# Patient Record
Sex: MALE | Race: WHITE | NOT HISPANIC OR LATINO
[De-identification: names, ages, dates, MRNs, and addresses within clinical notes are randomized per-mention and may not be internally consistent; named-entity substitution may affect disease eponyms.]

---

## 2017-01-03 ENCOUNTER — APPOINTMENT (OUTPATIENT)
Dept: PULMONOLOGY | Facility: CLINIC | Age: 72
End: 2017-01-03

## 2017-01-03 VITALS — SYSTOLIC BLOOD PRESSURE: 120 MMHG | OXYGEN SATURATION: 98 % | HEART RATE: 70 BPM | DIASTOLIC BLOOD PRESSURE: 70 MMHG

## 2017-01-10 ENCOUNTER — APPOINTMENT (OUTPATIENT)
Dept: INFECTIOUS DISEASE | Facility: CLINIC | Age: 72
End: 2017-01-10

## 2017-01-10 VITALS
BODY MASS INDEX: 23.49 KG/M2 | TEMPERATURE: 97.4 F | OXYGEN SATURATION: 98 % | HEIGHT: 68 IN | DIASTOLIC BLOOD PRESSURE: 75 MMHG | SYSTOLIC BLOOD PRESSURE: 126 MMHG | WEIGHT: 155 LBS | HEART RATE: 61 BPM

## 2017-01-10 DIAGNOSIS — J18.9 PNEUMONIA, UNSPECIFIED ORGANISM: ICD-10-CM

## 2017-01-10 DIAGNOSIS — R05 COUGH: ICD-10-CM

## 2017-01-10 DIAGNOSIS — Z87.898 PERSONAL HISTORY OF OTHER SPECIFIED CONDITIONS: ICD-10-CM

## 2017-01-10 DIAGNOSIS — E11.9 TYPE 2 DIABETES MELLITUS W/OUT COMPLICATIONS: ICD-10-CM

## 2017-01-13 PROBLEM — K64.8 INTERNAL HEMORRHOIDS: Status: RESOLVED | Noted: 2017-01-13 | Resolved: 2017-01-13

## 2017-01-13 PROBLEM — K64.5 THROMBOSED HEMORRHOIDS: Status: RESOLVED | Noted: 2017-01-13 | Resolved: 2017-01-13

## 2017-01-17 LAB
25(OH)D3 SERPL-MCNC: 34.9 NG/ML
ADJUSTED MITOGEN: >10 IU/ML
ADJUSTED TB AG: 0.05 IU/ML
ALBUMIN SERPL ELPH-MCNC: 4.6 G/DL
ALP BLD-CCNC: 99 U/L
ALT SERPL-CCNC: 20 U/L
ANION GAP SERPL CALC-SCNC: 12 MMOL/L
APPEARANCE: CLEAR
AST SERPL-CCNC: 23 U/L
BACTERIA: NEGATIVE
BASOPHILS # BLD AUTO: 0.02 K/UL
BASOPHILS NFR BLD AUTO: 0.4 %
BILIRUB SERPL-MCNC: 0.5 MG/DL
BILIRUBIN URINE: NEGATIVE
BLOOD URINE: NEGATIVE
BUN SERPL-MCNC: 16 MG/DL
C TRACH DNA SPEC QL NAA+PROBE: NORMAL
C TRACH RRNA SPEC QL NAA+PROBE: NORMAL
CALCIUM SERPL-MCNC: 9.9 MG/DL
CD3 CELLS # BLD: 1666 /UL
CD3 CELLS NFR BLD: 79 %
CD3+CD4+ CELLS # BLD: 457 /UL
CD3+CD4+ CELLS NFR BLD: 22 %
CD3+CD4+ CELLS/CD3+CD8+ CLL SPEC: 0.39 RATIO
CD3+CD8+ CELLS # SPEC: 1168 /UL
CD3+CD8+ CELLS NFR BLD: 56 %
CHLORIDE SERPL-SCNC: 100 MMOL/L
CHOLEST SERPL-MCNC: 123 MG/DL
CHOLEST/HDLC SERPL: 2.7 RATIO
CO2 SERPL-SCNC: 28 MMOL/L
COLOR: YELLOW
CREAT SERPL-MCNC: 1.07 MG/DL
CREAT SPEC-SCNC: 94 MG/DL
CREAT/PROT UR: 0.3 RATIO
EOSINOPHIL # BLD AUTO: 0.08 K/UL
EOSINOPHIL NFR BLD AUTO: 1.5 %
GLUCOSE QUALITATIVE U: NORMAL MG/DL
GLUCOSE SERPL-MCNC: 115 MG/DL
HBA1C MFR BLD HPLC: 6.4 %
HCT VFR BLD CALC: 51.7 %
HCV AB SER QL: NONREACTIVE
HCV S/CO RATIO: 0.13 S/CO
HDLC SERPL-MCNC: 46 MG/DL
HGB BLD-MCNC: 16 G/DL
HIV1 RNA # SERPL NAA+PROBE: NORMAL COPIES/ML
IMM GRANULOCYTES NFR BLD AUTO: 0 %
KETONES URINE: NEGATIVE
LDLC SERPL CALC-MCNC: 57 MG/DL
LEUKOCYTE ESTERASE URINE: NEGATIVE
LYMPHOCYTES # BLD AUTO: 2.35 K/UL
LYMPHOCYTES NFR BLD AUTO: 45 %
M TB IFN-G BLD-IMP: NEGATIVE
MAN DIFF?: NORMAL
MCHC RBC-ENTMCNC: 27.7 PG
MCHC RBC-ENTMCNC: 30.9 GM/DL
MCV RBC AUTO: 89.4 FL
MICROSCOPIC-UA: NORMAL
MONOCYTES # BLD AUTO: 0.28 K/UL
MONOCYTES NFR BLD AUTO: 5.4 %
N GONORRHOEA DNA SPEC QL NAA+PROBE: NORMAL
N GONORRHOEA RRNA SPEC QL NAA+PROBE: NORMAL
NEUTROPHILS # BLD AUTO: 2.49 K/UL
NEUTROPHILS NFR BLD AUTO: 47.7 %
NITRITE URINE: NEGATIVE
PH URINE: 5.5
PHOSPHATE SERPL-MCNC: 3.2 MG/DL
PLATELET # BLD AUTO: 181 K/UL
POTASSIUM SERPL-SCNC: 5 MMOL/L
PROT SERPL-MCNC: 8.9 G/DL
PROT UR-MCNC: 26 MG/DL
PROTEIN URINE: NEGATIVE MG/DL
PSA SERPL-MCNC: 1.54 NG/ML
QUANTIFERON GOLD NIL: 0.26 IU/ML
RBC # BLD: 5.78 M/UL
RBC # FLD: 15.3 %
RED BLOOD CELLS URINE: 1 /HPF
SODIUM SERPL-SCNC: 140 MMOL/L
SOURCE AMPLIFICATION: NORMAL
SPECIFIC GRAVITY URINE: 1.02
SQUAMOUS EPITHELIAL CELLS: 0 /HPF
T PALLIDUM AB SER QL IA: NEGATIVE
TRIGL SERPL-MCNC: 101 MG/DL
UROBILINOGEN URINE: NORMAL MG/DL
VIRAL LOAD LOG: <1.6 LG10COP/ML
WBC # FLD AUTO: 5.22 K/UL
WHITE BLOOD CELLS URINE: 1 /HPF

## 2017-01-18 ENCOUNTER — APPOINTMENT (OUTPATIENT)
Dept: SURGERY | Facility: CLINIC | Age: 72
End: 2017-01-18

## 2017-01-18 DIAGNOSIS — K64.9 UNSPECIFIED HEMORRHOIDS: ICD-10-CM

## 2017-01-18 DIAGNOSIS — K64.8 OTHER HEMORRHOIDS: ICD-10-CM

## 2017-01-18 DIAGNOSIS — K64.5 PERIANAL VENOUS THROMBOSIS: ICD-10-CM

## 2017-03-13 ENCOUNTER — RX RENEWAL (OUTPATIENT)
Age: 72
End: 2017-03-13

## 2017-03-15 RX ORDER — LEVOFLOXACIN 750 MG/1
750 TABLET, FILM COATED ORAL
Qty: 7 | Refills: 0 | Status: DISCONTINUED | COMMUNITY
Start: 2016-10-21

## 2017-03-22 ENCOUNTER — APPOINTMENT (OUTPATIENT)
Dept: SURGERY | Facility: CLINIC | Age: 72
End: 2017-03-22

## 2017-03-22 VITALS
OXYGEN SATURATION: 98 % | HEART RATE: 66 BPM | DIASTOLIC BLOOD PRESSURE: 77 MMHG | TEMPERATURE: 98.3 F | RESPIRATION RATE: 16 BRPM | SYSTOLIC BLOOD PRESSURE: 104 MMHG

## 2017-03-22 DIAGNOSIS — K64.4 RESIDUAL HEMORRHOIDAL SKIN TAGS: ICD-10-CM

## 2017-03-22 DIAGNOSIS — K57.32 DIVERTICULITIS OF LARGE INTESTINE W/OUT PERFORATION OR ABSCESS W/OUT BLEEDING: ICD-10-CM

## 2017-03-23 PROBLEM — K57.32 DIVERTICULITIS OF COLON: Status: ACTIVE | Noted: 2017-03-23

## 2017-04-05 ENCOUNTER — MED ADMIN CHARGE (OUTPATIENT)
Age: 72
End: 2017-04-05

## 2017-04-05 ENCOUNTER — APPOINTMENT (OUTPATIENT)
Dept: INFECTIOUS DISEASE | Facility: CLINIC | Age: 72
End: 2017-04-05

## 2017-04-06 ENCOUNTER — OTHER (OUTPATIENT)
Age: 72
End: 2017-04-06

## 2017-04-25 ENCOUNTER — RESULT REVIEW (OUTPATIENT)
Age: 72
End: 2017-04-25

## 2017-04-26 ENCOUNTER — FORM ENCOUNTER (OUTPATIENT)
Age: 72
End: 2017-04-26

## 2017-04-27 ENCOUNTER — APPOINTMENT (OUTPATIENT)
Dept: CT IMAGING | Facility: IMAGING CENTER | Age: 72
End: 2017-04-27

## 2017-04-27 ENCOUNTER — OUTPATIENT (OUTPATIENT)
Dept: OUTPATIENT SERVICES | Facility: HOSPITAL | Age: 72
LOS: 1 days | End: 2017-04-27
Payer: COMMERCIAL

## 2017-04-27 DIAGNOSIS — R93.8 ABNORMAL FINDINGS ON DIAGNOSTIC IMAGING OF OTHER SPECIFIED BODY STRUCTURES: ICD-10-CM

## 2017-04-27 PROCEDURE — 71260 CT THORAX DX C+: CPT

## 2017-04-27 PROCEDURE — 82565 ASSAY OF CREATININE: CPT

## 2017-05-02 ENCOUNTER — OTHER (OUTPATIENT)
Age: 72
End: 2017-05-02

## 2017-05-08 ENCOUNTER — OUTPATIENT (OUTPATIENT)
Dept: OUTPATIENT SERVICES | Facility: HOSPITAL | Age: 72
LOS: 1 days | End: 2017-05-08
Payer: COMMERCIAL

## 2017-05-08 VITALS
WEIGHT: 149.91 LBS | HEIGHT: 67 IN | RESPIRATION RATE: 17 BRPM | TEMPERATURE: 98 F | HEART RATE: 60 BPM | DIASTOLIC BLOOD PRESSURE: 75 MMHG | OXYGEN SATURATION: 99 % | SYSTOLIC BLOOD PRESSURE: 122 MMHG

## 2017-05-08 DIAGNOSIS — Z21 ASYMPTOMATIC HUMAN IMMUNODEFICIENCY VIRUS [HIV] INFECTION STATUS: ICD-10-CM

## 2017-05-08 DIAGNOSIS — J18.1 LOBAR PNEUMONIA, UNSPECIFIED ORGANISM: ICD-10-CM

## 2017-05-08 DIAGNOSIS — K60.3 ANAL FISTULA: ICD-10-CM

## 2017-05-08 DIAGNOSIS — I10 ESSENTIAL (PRIMARY) HYPERTENSION: ICD-10-CM

## 2017-05-08 DIAGNOSIS — Z01.818 ENCOUNTER FOR OTHER PREPROCEDURAL EXAMINATION: ICD-10-CM

## 2017-05-08 DIAGNOSIS — E78.5 HYPERLIPIDEMIA, UNSPECIFIED: ICD-10-CM

## 2017-05-08 DIAGNOSIS — Z90.49 ACQUIRED ABSENCE OF OTHER SPECIFIED PARTS OF DIGESTIVE TRACT: Chronic | ICD-10-CM

## 2017-05-08 LAB
ANION GAP SERPL CALC-SCNC: 15 MMOL/L — SIGNIFICANT CHANGE UP (ref 5–17)
BUN SERPL-MCNC: 21 MG/DL — SIGNIFICANT CHANGE UP (ref 7–23)
CALCIUM SERPL-MCNC: 9.6 MG/DL — SIGNIFICANT CHANGE UP (ref 8.4–10.5)
CHLORIDE SERPL-SCNC: 100 MMOL/L — SIGNIFICANT CHANGE UP (ref 96–108)
CO2 SERPL-SCNC: 25 MMOL/L — SIGNIFICANT CHANGE UP (ref 22–31)
CREAT SERPL-MCNC: 1.4 MG/DL — HIGH (ref 0.5–1.3)
GLUCOSE SERPL-MCNC: 105 MG/DL — HIGH (ref 70–99)
HCT VFR BLD CALC: 48.4 % — SIGNIFICANT CHANGE UP (ref 39–50)
HGB BLD-MCNC: 15.5 G/DL — SIGNIFICANT CHANGE UP (ref 13–17)
MCHC RBC-ENTMCNC: 27.5 PG — SIGNIFICANT CHANGE UP (ref 27–34)
MCHC RBC-ENTMCNC: 32 GM/DL — SIGNIFICANT CHANGE UP (ref 32–36)
MCV RBC AUTO: 85.8 FL — SIGNIFICANT CHANGE UP (ref 80–100)
PLATELET # BLD AUTO: 245 K/UL — SIGNIFICANT CHANGE UP (ref 150–400)
POTASSIUM SERPL-MCNC: 5 MMOL/L — SIGNIFICANT CHANGE UP (ref 3.5–5.3)
POTASSIUM SERPL-SCNC: 5 MMOL/L — SIGNIFICANT CHANGE UP (ref 3.5–5.3)
RBC # BLD: 5.64 M/UL — SIGNIFICANT CHANGE UP (ref 4.2–5.8)
RBC # FLD: 14.6 % — HIGH (ref 10.3–14.5)
SODIUM SERPL-SCNC: 140 MMOL/L — SIGNIFICANT CHANGE UP (ref 135–145)
WBC # BLD: 4.4 K/UL — SIGNIFICANT CHANGE UP (ref 3.8–10.5)
WBC # FLD AUTO: 4.4 K/UL — SIGNIFICANT CHANGE UP (ref 3.8–10.5)

## 2017-05-08 PROCEDURE — 80048 BASIC METABOLIC PNL TOTAL CA: CPT

## 2017-05-08 PROCEDURE — G0463: CPT

## 2017-05-08 PROCEDURE — 85027 COMPLETE CBC AUTOMATED: CPT

## 2017-05-08 RX ORDER — LIDOCAINE HCL 20 MG/ML
0.2 VIAL (ML) INJECTION ONCE
Qty: 0 | Refills: 0 | Status: DISCONTINUED | OUTPATIENT
Start: 2017-05-09 | End: 2017-05-24

## 2017-05-08 RX ORDER — SODIUM CHLORIDE 9 MG/ML
3 INJECTION INTRAMUSCULAR; INTRAVENOUS; SUBCUTANEOUS EVERY 8 HOURS
Qty: 0 | Refills: 0 | Status: DISCONTINUED | OUTPATIENT
Start: 2017-05-09 | End: 2017-05-24

## 2017-05-08 RX ORDER — LIDOCAINE HCL 20 MG/ML
0.3 VIAL (ML) INJECTION ONCE
Qty: 0 | Refills: 0 | Status: DISCONTINUED | OUTPATIENT
Start: 2017-05-09 | End: 2017-05-24

## 2017-05-08 NOTE — H&P PST ADULT - PMH
HIV (human immunodeficiency virus infection)    Hyperlipidemia    Hypertension HIV (human immunodeficiency virus infection)    Hyperlipidemia    Hypertension    Pneumonia of right lower lobe due to infectious organism Anal fistula    Hemorrhoids, internal    HIV (human immunodeficiency virus infection)    Hyperlipidemia    Hypertension    Marijuana smoker    Pneumonia of right lower lobe due to infectious organism

## 2017-05-08 NOTE — H&P PST ADULT - HISTORY OF PRESENT ILLNESS
70 y/o Male H/O HIV, HLD, Hemorrhoids. C/O Intermittent Diarrhea. S/P Colonoscopy. Presents for Examination under anesthesia, possible fistulotomy, possible seton placement. 70 y/o Male H/O HIV, HLD, Pneumonia 10/2016, Hemorrhoids, Anal Fistula .  C/O Intermittent Diarrhea. S/P Colonoscopy. Presents for Examination under anesthesia, possible fistulotomy, possible seton placement.

## 2017-05-08 NOTE — H&P PST ADULT - NSANTHOSAYNRD_GEN_A_CORE
No. ENEDINA screening performed.  STOP BANG Legend: 0-2 = LOW Risk; 3-4 = INTERMEDIATE Risk; 5-8 = HIGH Risk

## 2017-05-08 NOTE — H&P PST ADULT - PROBLEM SELECTOR PLAN 5
Pt had CT lungs 4/27/17 and CT continues to show consolidation right lower lobe. Left message for Dr Mack to fax note to [facility] pt is OK to have surgery.  (Pneumonia 10/2016)

## 2017-05-09 ENCOUNTER — OUTPATIENT (OUTPATIENT)
Dept: OUTPATIENT SERVICES | Facility: HOSPITAL | Age: 72
LOS: 1 days | End: 2017-05-09
Payer: COMMERCIAL

## 2017-05-09 ENCOUNTER — RESULT REVIEW (OUTPATIENT)
Age: 72
End: 2017-05-09

## 2017-05-09 ENCOUNTER — TRANSCRIPTION ENCOUNTER (OUTPATIENT)
Age: 72
End: 2017-05-09

## 2017-05-09 ENCOUNTER — APPOINTMENT (OUTPATIENT)
Dept: SURGERY | Facility: HOSPITAL | Age: 72
End: 2017-05-09

## 2017-05-09 VITALS
OXYGEN SATURATION: 98 % | RESPIRATION RATE: 16 BRPM | HEART RATE: 62 BPM | DIASTOLIC BLOOD PRESSURE: 66 MMHG | TEMPERATURE: 98 F | HEIGHT: 67 IN | WEIGHT: 149.91 LBS | SYSTOLIC BLOOD PRESSURE: 111 MMHG

## 2017-05-09 VITALS
DIASTOLIC BLOOD PRESSURE: 65 MMHG | OXYGEN SATURATION: 98 % | RESPIRATION RATE: 16 BRPM | TEMPERATURE: 98 F | HEART RATE: 67 BPM | SYSTOLIC BLOOD PRESSURE: 109 MMHG

## 2017-05-09 DIAGNOSIS — K60.3 ANAL FISTULA: ICD-10-CM

## 2017-05-09 DIAGNOSIS — Z90.49 ACQUIRED ABSENCE OF OTHER SPECIFIED PARTS OF DIGESTIVE TRACT: Chronic | ICD-10-CM

## 2017-05-09 PROCEDURE — 88304 TISSUE EXAM BY PATHOLOGIST: CPT | Mod: 26

## 2017-05-09 PROCEDURE — 46258 REMOVE IN/EX HEM GRP W/FISTU: CPT

## 2017-05-09 PROCEDURE — C1889: CPT

## 2017-05-09 PROCEDURE — 88304 TISSUE EXAM BY PATHOLOGIST: CPT

## 2017-05-09 RX ORDER — OXYCODONE HYDROCHLORIDE 5 MG/1
1 TABLET ORAL
Qty: 25 | Refills: 0
Start: 2017-05-09

## 2017-05-09 RX ORDER — ONDANSETRON 8 MG/1
4 TABLET, FILM COATED ORAL ONCE
Qty: 0 | Refills: 0 | Status: DISCONTINUED | OUTPATIENT
Start: 2017-05-09 | End: 2017-05-24

## 2017-05-09 RX ORDER — CELECOXIB 200 MG/1
200 CAPSULE ORAL ONCE
Qty: 0 | Refills: 0 | Status: DISCONTINUED | OUTPATIENT
Start: 2017-05-09 | End: 2017-05-24

## 2017-05-09 RX ORDER — OXYCODONE HYDROCHLORIDE 5 MG/1
5 TABLET ORAL ONCE
Qty: 0 | Refills: 0 | Status: DISCONTINUED | OUTPATIENT
Start: 2017-05-09 | End: 2017-05-09

## 2017-05-09 RX ORDER — SODIUM CHLORIDE 9 MG/ML
1000 INJECTION, SOLUTION INTRAVENOUS
Qty: 0 | Refills: 0 | Status: DISCONTINUED | OUTPATIENT
Start: 2017-05-09 | End: 2017-05-24

## 2017-05-09 NOTE — ASU PATIENT PROFILE, ADULT - NSSUBSTANCEUSE_GEN_ALL_CORE_SD
caffeine/street drug/inhalant/medication abuse street drug/inhalant/medication abuse/Current Marijuana smoker/caffeine

## 2017-05-09 NOTE — ASU PATIENT PROFILE, ADULT - PMH
Anal fistula    Hemorrhoids, internal    HIV (human immunodeficiency virus infection)    Hyperlipidemia    Hypertension    Marijuana smoker    Pneumonia of right lower lobe due to infectious organism

## 2017-05-09 NOTE — BRIEF OPERATIVE NOTE - PROCEDURE
Hemorrhoid excisions  05/09/2017    Active  AVALENTIN2  Fistulotomy, anal  05/09/2017    Active  AVALENTIN2

## 2017-05-09 NOTE — ASU DISCHARGE PLAN (ADULT/PEDIATRIC). - INSTRUCTIONS
You may resume regular diet when you get home, depending upon how you feel. Peanuts, popcorn, and other "scratchy" food should be avoided

## 2017-05-09 NOTE — ASU PATIENT PROFILE, ADULT - NS TRANSFER PATIENT BELONGINGS
Jewelry/Money (specify)/bracelet/necklace/Wrist Watch Wrist Watch/bracelet/necklace/None/Jewelry/Money (specify)

## 2017-05-09 NOTE — ASU DISCHARGE PLAN (ADULT/PEDIATRIC). - FOLLOWUP APPOINTMENT CLINIC/PHYSICIAN
Please follow up with Dr. Barger 2 weeks after your operation. You may call [phone] to schedule an appointment.

## 2017-05-09 NOTE — ASU DISCHARGE PLAN (ADULT/PEDIATRIC). - MEDICATION SUMMARY - MEDICATIONS TO TAKE
I will START or STAY ON the medications listed below when I get home from the hospital:    Viagra  --  by mouth   -- Indication: For home med    oxyCODONE 5 mg oral tablet  -- 1 tab(s) by mouth every 4 hours, As Needed to 6 hours -for moderate pain -for severe pain MDD:6  -- Caution federal law prohibits the transfer of this drug to any person other  than the person for whom it was prescribed.  It is very important that you take or use this exactly as directed.  Do not skip doses or discontinue unless directed by your doctor.  May cause drowsiness.  Alcohol may intensify this effect.  Use care when operating dangerous machinery.  This prescription cannot be refilled.  Using more of this medication than prescribed may cause serious breathing problems.    -- Indication: For moderate to severe pain    aspirin 81 mg oral tablet  -- 1 tab(s) by mouth once a day  -- Indication: For home med    sertraline 50 mg oral tablet  -- 0.5 tab(s) by mouth once a day  -- Indication: For home med    Lipitor 20 mg oral tablet  -- 1 tab(s) by mouth once a day  -- Indication: For home med    Zetia 10 mg oral tablet  -- 1 tab(s) by mouth once a day  -- Indication: For home med    Tivicay 50 mg oral tablet  -- 1 tab(s) by mouth once a day  -- Indication: For home med    Truvada  --  by mouth   -- Indication: For home med    hydroCHLOROthiazide 12.5 mg oral tablet  -- 1 tab(s) by mouth once a day  -- Indication: For home med    multivitamin  -- 1 tab(s) by mouth once a day  -- Indication: For home med    Vitamin D3 1000 intl units oral capsule  -- 1 cap(s) by mouth once a day  -- Indication: For home med

## 2017-05-10 ENCOUNTER — APPOINTMENT (OUTPATIENT)
Dept: INFECTIOUS DISEASE | Facility: CLINIC | Age: 72
End: 2017-05-10

## 2017-05-10 VITALS
SYSTOLIC BLOOD PRESSURE: 117 MMHG | OXYGEN SATURATION: 94 % | HEART RATE: 86 BPM | WEIGHT: 153 LBS | DIASTOLIC BLOOD PRESSURE: 68 MMHG | HEIGHT: 68 IN | TEMPERATURE: 98.2 F | BODY MASS INDEX: 23.19 KG/M2

## 2017-05-16 ENCOUNTER — MEDICATION RENEWAL (OUTPATIENT)
Age: 72
End: 2017-05-16

## 2017-05-16 LAB — SURGICAL PATHOLOGY STUDY: SIGNIFICANT CHANGE UP

## 2017-05-18 ENCOUNTER — APPOINTMENT (OUTPATIENT)
Dept: PULMONOLOGY | Facility: CLINIC | Age: 72
End: 2017-05-18

## 2017-05-18 VITALS
WEIGHT: 153 LBS | HEIGHT: 68 IN | SYSTOLIC BLOOD PRESSURE: 112 MMHG | HEART RATE: 61 BPM | BODY MASS INDEX: 23.19 KG/M2 | RESPIRATION RATE: 17 BRPM | OXYGEN SATURATION: 99 % | DIASTOLIC BLOOD PRESSURE: 70 MMHG

## 2017-05-23 LAB
ALBUMIN SERPL ELPH-MCNC: 4.3 G/DL
ALP BLD-CCNC: 95 U/L
ALT SERPL-CCNC: 13 U/L
ANION GAP SERPL CALC-SCNC: 16 MMOL/L
AST SERPL-CCNC: 20 U/L
BASOPHILS # BLD AUTO: 0.03 K/UL
BASOPHILS NFR BLD AUTO: 0.4 %
BILIRUB SERPL-MCNC: 0.3 MG/DL
BUN SERPL-MCNC: 21 MG/DL
CALCIUM SERPL-MCNC: 9.6 MG/DL
CD3 CELLS # BLD: 2137 /UL
CD3 CELLS NFR BLD: 80 %
CD3+CD4+ CELLS # BLD: 746 /UL
CD3+CD4+ CELLS NFR BLD: 28 %
CD3+CD4+ CELLS/CD3+CD8+ CLL SPEC: 0.55 RATIO
CD3+CD8+ CELLS # SPEC: 1349 /UL
CD3+CD8+ CELLS NFR BLD: 50 %
CHLORIDE SERPL-SCNC: 99 MMOL/L
CO2 SERPL-SCNC: 24 MMOL/L
CREAT SERPL-MCNC: 1.51 MG/DL
EOSINOPHIL # BLD AUTO: 0.05 K/UL
EOSINOPHIL NFR BLD AUTO: 0.7 %
GLUCOSE SERPL-MCNC: 142 MG/DL
HBA1C MFR BLD HPLC: 6 %
HBV SURFACE AB SER QL: NONREACTIVE
HCT VFR BLD CALC: 48.4 %
HGB BLD-MCNC: 15.2 G/DL
HIV1 RNA # SERPL NAA+PROBE: NOT DETECTED COPIES/ML
IMM GRANULOCYTES NFR BLD AUTO: 0.1 %
LYMPHOCYTES # BLD AUTO: 2.6 K/UL
LYMPHOCYTES NFR BLD AUTO: 35.3 %
MAN DIFF?: NORMAL
MCHC RBC-ENTMCNC: 27.7 PG
MCHC RBC-ENTMCNC: 31.4 GM/DL
MCV RBC AUTO: 88.3 FL
MONOCYTES # BLD AUTO: 0.46 K/UL
MONOCYTES NFR BLD AUTO: 6.2 %
NEUTROPHILS # BLD AUTO: 4.22 K/UL
NEUTROPHILS NFR BLD AUTO: 57.3 %
PLATELET # BLD AUTO: 272 K/UL
POTASSIUM SERPL-SCNC: 3.9 MMOL/L
PROT SERPL-MCNC: 8.7 G/DL
RBC # BLD: 5.48 M/UL
RBC # FLD: 14.6 %
SODIUM SERPL-SCNC: 139 MMOL/L
VIRAL LOAD LOG: NOT DETECTED LG10COP/ML
WBC # FLD AUTO: 7.37 K/UL

## 2017-06-01 ENCOUNTER — APPOINTMENT (OUTPATIENT)
Dept: SURGERY | Facility: CLINIC | Age: 72
End: 2017-06-01

## 2017-06-01 VITALS
RESPIRATION RATE: 16 BRPM | TEMPERATURE: 97.6 F | DIASTOLIC BLOOD PRESSURE: 71 MMHG | SYSTOLIC BLOOD PRESSURE: 131 MMHG | OXYGEN SATURATION: 96 % | HEART RATE: 81 BPM

## 2017-06-14 NOTE — H&P PST ADULT - PROBLEM SELECTOR PLAN 1
Refill request is for a maintenance medication and has met the criteria specified in the Ambulatory Medication Refill Standing Order for eligibility, visits, laboratory, alerts and was sent to the requested pharmacy. Examination under anesthesia, possible fistulotomy, possible seton placement.   Check labs

## 2017-07-13 ENCOUNTER — APPOINTMENT (OUTPATIENT)
Dept: SURGERY | Facility: CLINIC | Age: 72
End: 2017-07-13

## 2017-07-13 VITALS
OXYGEN SATURATION: 97 % | SYSTOLIC BLOOD PRESSURE: 132 MMHG | DIASTOLIC BLOOD PRESSURE: 83 MMHG | HEART RATE: 74 BPM | RESPIRATION RATE: 15 BRPM | TEMPERATURE: 98.1 F

## 2017-08-15 ENCOUNTER — RX RENEWAL (OUTPATIENT)
Age: 72
End: 2017-08-15

## 2017-09-13 ENCOUNTER — APPOINTMENT (OUTPATIENT)
Dept: INFECTIOUS DISEASE | Facility: CLINIC | Age: 72
End: 2017-09-13
Payer: COMMERCIAL

## 2017-09-13 VITALS
DIASTOLIC BLOOD PRESSURE: 74 MMHG | BODY MASS INDEX: 22.73 KG/M2 | WEIGHT: 150 LBS | TEMPERATURE: 97.6 F | OXYGEN SATURATION: 96 % | RESPIRATION RATE: 16 BRPM | HEIGHT: 68 IN | HEART RATE: 85 BPM | SYSTOLIC BLOOD PRESSURE: 126 MMHG

## 2017-09-13 PROCEDURE — 99215 OFFICE O/P EST HI 40 MIN: CPT | Mod: 25

## 2017-09-13 PROCEDURE — G0009: CPT

## 2017-09-13 PROCEDURE — G0008: CPT

## 2017-09-13 PROCEDURE — 90686 IIV4 VACC NO PRSV 0.5 ML IM: CPT

## 2017-09-13 PROCEDURE — 90732 PPSV23 VACC 2 YRS+ SUBQ/IM: CPT

## 2017-09-13 PROCEDURE — 90472 IMMUNIZATION ADMIN EACH ADD: CPT

## 2017-09-14 ENCOUNTER — APPOINTMENT (OUTPATIENT)
Dept: SURGERY | Facility: CLINIC | Age: 72
End: 2017-09-14
Payer: COMMERCIAL

## 2017-09-14 VITALS
RESPIRATION RATE: 15 BRPM | TEMPERATURE: 98.2 F | DIASTOLIC BLOOD PRESSURE: 78 MMHG | OXYGEN SATURATION: 99 % | HEART RATE: 65 BPM | SYSTOLIC BLOOD PRESSURE: 130 MMHG

## 2017-09-14 DIAGNOSIS — K60.3 ANAL FISTULA: ICD-10-CM

## 2017-09-14 LAB
ALBUMIN SERPL ELPH-MCNC: 4.5 G/DL
ALP BLD-CCNC: 113 U/L
ALT SERPL-CCNC: 24 U/L
ANION GAP SERPL CALC-SCNC: 16 MMOL/L
AST SERPL-CCNC: 25 U/L
BASOPHILS # BLD AUTO: 0.02 K/UL
BASOPHILS NFR BLD AUTO: 0.3 %
BILIRUB SERPL-MCNC: 0.3 MG/DL
BUN SERPL-MCNC: 15 MG/DL
C TRACH RRNA SPEC QL NAA+PROBE: NORMAL
CALCIUM SERPL-MCNC: 10.3 MG/DL
CD3 CELLS # BLD: 1939 /UL
CD3 CELLS NFR BLD: 74 %
CD3+CD4+ CELLS # BLD: 639 /UL
CD3+CD4+ CELLS NFR BLD: 25 %
CD3+CD4+ CELLS/CD3+CD8+ CLL SPEC: 0.51 RATIO
CD3+CD8+ CELLS # SPEC: 1255 /UL
CD3+CD8+ CELLS NFR BLD: 48 %
CHLORIDE SERPL-SCNC: 100 MMOL/L
CO2 SERPL-SCNC: 25 MMOL/L
CREAT SERPL-MCNC: 0.8 MG/DL
EOSINOPHIL # BLD AUTO: 0.11 K/UL
EOSINOPHIL NFR BLD AUTO: 1.6 %
GLUCOSE SERPL-MCNC: 147 MG/DL
HCT VFR BLD CALC: 50.8 %
HGB BLD-MCNC: 16.4 G/DL
IMM GRANULOCYTES NFR BLD AUTO: 0.1 %
LYMPHOCYTES # BLD AUTO: 2.21 K/UL
LYMPHOCYTES NFR BLD AUTO: 31.2 %
MAN DIFF?: NORMAL
MCHC RBC-ENTMCNC: 28.6 PG
MCHC RBC-ENTMCNC: 32.3 GM/DL
MCV RBC AUTO: 88.7 FL
MONOCYTES # BLD AUTO: 0.36 K/UL
MONOCYTES NFR BLD AUTO: 5.1 %
N GONORRHOEA RRNA SPEC QL NAA+PROBE: NORMAL
NEUTROPHILS # BLD AUTO: 4.38 K/UL
NEUTROPHILS NFR BLD AUTO: 61.7 %
PLATELET # BLD AUTO: 237 K/UL
POTASSIUM SERPL-SCNC: 5.4 MMOL/L
PROT SERPL-MCNC: 9.3 G/DL
PSA FREE FLD-MCNC: 34.2
PSA FREE SERPL-MCNC: 0.88 NG/ML
PSA SERPL-MCNC: 2.57 NG/ML
RBC # BLD: 5.73 M/UL
RBC # FLD: 14.2 %
SODIUM SERPL-SCNC: 141 MMOL/L
SOURCE AMPLIFICATION: NORMAL
T PALLIDUM AB SER QL IA: NEGATIVE
WBC # FLD AUTO: 7.09 K/UL

## 2017-09-14 PROCEDURE — 99213 OFFICE O/P EST LOW 20 MIN: CPT | Mod: 25

## 2017-09-14 PROCEDURE — 46600 DIAGNOSTIC ANOSCOPY SPX: CPT

## 2017-09-15 ENCOUNTER — OTHER (OUTPATIENT)
Age: 72
End: 2017-09-15

## 2017-09-15 LAB
HIV1 RNA # SERPL NAA+PROBE: <30 COPIES/ML
HIV1 RNA # SERPL NAA+PROBE: ABNORMAL
VIRAL LOAD INTERP: NORMAL
VIRAL LOAD LOG: <1.47 LG COP/ML

## 2017-10-24 ENCOUNTER — FORM ENCOUNTER (OUTPATIENT)
Age: 72
End: 2017-10-24

## 2017-10-25 ENCOUNTER — APPOINTMENT (OUTPATIENT)
Dept: CT IMAGING | Facility: IMAGING CENTER | Age: 72
End: 2017-10-25
Payer: COMMERCIAL

## 2017-10-25 ENCOUNTER — OUTPATIENT (OUTPATIENT)
Dept: OUTPATIENT SERVICES | Facility: HOSPITAL | Age: 72
LOS: 1 days | End: 2017-10-25
Payer: COMMERCIAL

## 2017-10-25 DIAGNOSIS — R06.02 SHORTNESS OF BREATH: ICD-10-CM

## 2017-10-25 DIAGNOSIS — R93.8 ABNORMAL FINDINGS ON DIAGNOSTIC IMAGING OF OTHER SPECIFIED BODY STRUCTURES: ICD-10-CM

## 2017-10-25 DIAGNOSIS — Z90.49 ACQUIRED ABSENCE OF OTHER SPECIFIED PARTS OF DIGESTIVE TRACT: Chronic | ICD-10-CM

## 2017-10-25 PROCEDURE — 71250 CT THORAX DX C-: CPT

## 2017-10-25 PROCEDURE — 71250 CT THORAX DX C-: CPT | Mod: 26

## 2017-11-09 ENCOUNTER — APPOINTMENT (OUTPATIENT)
Dept: PULMONOLOGY | Facility: CLINIC | Age: 72
End: 2017-11-09

## 2017-11-13 ENCOUNTER — MEDICATION RENEWAL (OUTPATIENT)
Age: 72
End: 2017-11-13

## 2017-11-24 ENCOUNTER — APPOINTMENT (OUTPATIENT)
Dept: PULMONOLOGY | Facility: CLINIC | Age: 72
End: 2017-11-24
Payer: MEDICARE

## 2017-11-24 VITALS
BODY MASS INDEX: 22.73 KG/M2 | SYSTOLIC BLOOD PRESSURE: 106 MMHG | HEIGHT: 68 IN | WEIGHT: 150 LBS | DIASTOLIC BLOOD PRESSURE: 62 MMHG | RESPIRATION RATE: 17 BRPM | OXYGEN SATURATION: 96 % | HEART RATE: 74 BPM

## 2017-11-24 PROCEDURE — 94010 BREATHING CAPACITY TEST: CPT

## 2017-11-24 PROCEDURE — 99214 OFFICE O/P EST MOD 30 MIN: CPT | Mod: 25

## 2018-01-17 ENCOUNTER — APPOINTMENT (OUTPATIENT)
Dept: INFECTIOUS DISEASE | Facility: CLINIC | Age: 73
End: 2018-01-17

## 2018-03-22 ENCOUNTER — APPOINTMENT (OUTPATIENT)
Dept: INFECTIOUS DISEASE | Facility: CLINIC | Age: 73
End: 2018-03-22
Payer: MEDICARE

## 2018-03-22 ENCOUNTER — LABORATORY RESULT (OUTPATIENT)
Age: 73
End: 2018-03-22

## 2018-03-22 VITALS
DIASTOLIC BLOOD PRESSURE: 66 MMHG | HEIGHT: 68 IN | WEIGHT: 147 LBS | BODY MASS INDEX: 22.28 KG/M2 | OXYGEN SATURATION: 98 % | HEART RATE: 63 BPM | SYSTOLIC BLOOD PRESSURE: 134 MMHG | TEMPERATURE: 97.1 F

## 2018-03-22 DIAGNOSIS — Z92.29 PERSONAL HISTORY OF OTHER DRUG THERAPY: ICD-10-CM

## 2018-03-22 PROCEDURE — 99215 OFFICE O/P EST HI 40 MIN: CPT

## 2018-03-22 RX ORDER — HYDROCORTISONE 25 MG/G
2.5 OINTMENT TOPICAL
Qty: 28 | Refills: 0 | Status: DISCONTINUED | COMMUNITY
Start: 2017-10-18 | End: 2018-03-22

## 2018-03-22 RX ORDER — OXYCODONE 5 MG/1
5 TABLET ORAL
Qty: 25 | Refills: 0 | Status: DISCONTINUED | COMMUNITY
Start: 2017-05-09 | End: 2018-03-22

## 2018-03-22 RX ORDER — CLINDAMYCIN HYDROCHLORIDE 300 MG/1
300 CAPSULE ORAL
Qty: 21 | Refills: 0 | Status: DISCONTINUED | COMMUNITY
Start: 2017-07-20 | End: 2018-03-22

## 2018-03-22 RX ORDER — IBUPROFEN 600 MG/1
600 TABLET, FILM COATED ORAL
Qty: 20 | Refills: 0 | Status: DISCONTINUED | COMMUNITY
Start: 2017-07-20 | End: 2018-03-22

## 2018-03-30 LAB
25(OH)D3 SERPL-MCNC: 56.1 NG/ML
ADJUSTED MITOGEN: >10 IU/ML
ADJUSTED TB AG: 0.06 IU/ML
ALBUMIN SERPL ELPH-MCNC: 4.9 G/DL
ALP BLD-CCNC: 91 U/L
ALT SERPL-CCNC: 14 U/L
ANION GAP SERPL CALC-SCNC: 15 MMOL/L
APPEARANCE: CLEAR
AST SERPL-CCNC: 23 U/L
BACTERIA: NEGATIVE
BASOPHILS NFR BLD AUTO: 1.7 %
BILIRUB SERPL-MCNC: 0.6 MG/DL
BILIRUBIN URINE: NEGATIVE
BLOOD URINE: NEGATIVE
BUN SERPL-MCNC: 20 MG/DL
C TRACH RRNA SPEC QL NAA+PROBE: NOT DETECTED
CALCIUM SERPL-MCNC: 10 MG/DL
CD3 CELLS # BLD: 1884 /UL
CD3 CELLS NFR BLD: 74 %
CD3+CD4+ CELLS # BLD: 770 /UL
CD3+CD4+ CELLS NFR BLD: 30 %
CD3+CD4+ CELLS/CD3+CD8+ CLL SPEC: 0.72 RATIO
CD3+CD8+ CELLS # SPEC: 1075 /UL
CD3+CD8+ CELLS NFR BLD: 42 %
CHLORIDE SERPL-SCNC: 100 MMOL/L
CHOLEST SERPL-MCNC: 118 MG/DL
CHOLEST/HDLC SERPL: 4.4 RATIO
CO2 SERPL-SCNC: 26 MMOL/L
COLOR: YELLOW
CREAT SERPL-MCNC: 1.12 MG/DL
EOSINOPHIL NFR BLD AUTO: 2.5 %
GLUCOSE QUALITATIVE U: NEGATIVE MG/DL
GLUCOSE SERPL-MCNC: 114 MG/DL
HBA1C MFR BLD HPLC: 5.2 %
HBV SURFACE AB SER QL: NONREACTIVE
HCT VFR BLD CALC: 45.5 %
HCV AB SER QL: NONREACTIVE
HCV S/CO RATIO: 0.13 S/CO
HDLC SERPL-MCNC: 27 MG/DL
HGB BLD-MCNC: 14.7 G/DL
HIV1 RNA # SERPL NAA+PROBE: ABNORMAL
HIV1 RNA # SERPL NAA+PROBE: ABNORMAL COPIES/ML
HYALINE CASTS: 0 /LPF
KETONES URINE: NEGATIVE
LDLC SERPL CALC-MCNC: 50 MG/DL
LEUKOCYTE ESTERASE URINE: NEGATIVE
LYMPHOCYTES # BLD AUTO: 1.72 K/UL
LYMPHOCYTES NFR BLD AUTO: 34.5 %
M TB IFN-G BLD-IMP: NEGATIVE
MAN DIFF?: NORMAL
MCHC RBC-ENTMCNC: 30.1 PG
MCHC RBC-ENTMCNC: 32.3 GM/DL
MCV RBC AUTO: 93.2 FL
MICROSCOPIC-UA: NORMAL
MONOCYTES NFR BLD AUTO: 8.4 %
N GONORRHOEA RRNA SPEC QL NAA+PROBE: NOT DETECTED
NEUTROPHILS # BLD AUTO: 2.18 K/UL
NEUTROPHILS NFR BLD AUTO: 43.7 %
NITRITE URINE: NEGATIVE
PH URINE: 5
PLATELET # BLD AUTO: 243 K/UL
POTASSIUM SERPL-SCNC: 4.7 MMOL/L
PROT SERPL-MCNC: 9.4 G/DL
PROTEIN URINE: NEGATIVE MG/DL
PSA SERPL-MCNC: 2.6 NG/ML
QUANTIFERON GOLD NIL: 0.31 IU/ML
RBC # BLD: 4.88 M/UL
RBC # FLD: 13.6 %
RED BLOOD CELLS URINE: 1 /HPF
SODIUM SERPL-SCNC: 141 MMOL/L
SOURCE AMPLIFICATION: NORMAL
SPECIFIC GRAVITY URINE: 1.02
SQUAMOUS EPITHELIAL CELLS: 0 /HPF
T PALLIDUM AB SER QL IA: NEGATIVE
TRIGL SERPL-MCNC: 206 MG/DL
UROBILINOGEN URINE: NEGATIVE MG/DL
VIRAL LOAD INTERP: NORMAL
VIRAL LOAD LOG: ABNORMAL LG COP/ML
WBC # FLD AUTO: 4.99 K/UL
WHITE BLOOD CELLS URINE: 1 /HPF

## 2018-06-05 ENCOUNTER — APPOINTMENT (OUTPATIENT)
Dept: PULMONOLOGY | Facility: CLINIC | Age: 73
End: 2018-06-05
Payer: MEDICARE

## 2018-06-05 ENCOUNTER — NON-APPOINTMENT (OUTPATIENT)
Age: 73
End: 2018-06-05

## 2018-06-05 VITALS
DIASTOLIC BLOOD PRESSURE: 72 MMHG | WEIGHT: 153 LBS | RESPIRATION RATE: 15 BRPM | OXYGEN SATURATION: 95 % | SYSTOLIC BLOOD PRESSURE: 128 MMHG | BODY MASS INDEX: 24.01 KG/M2 | HEART RATE: 74 BPM | HEIGHT: 67 IN

## 2018-06-05 PROCEDURE — 99214 OFFICE O/P EST MOD 30 MIN: CPT | Mod: 25

## 2018-06-05 PROCEDURE — 94010 BREATHING CAPACITY TEST: CPT

## 2018-06-22 ENCOUNTER — RX RENEWAL (OUTPATIENT)
Age: 73
End: 2018-06-22

## 2018-06-22 RX ORDER — SILDENAFIL 100 MG/1
100 TABLET, FILM COATED ORAL
Qty: 10 | Refills: 5 | Status: ACTIVE | COMMUNITY
Start: 2018-06-22 | End: 1900-01-01

## 2018-07-19 ENCOUNTER — APPOINTMENT (OUTPATIENT)
Dept: INFECTIOUS DISEASE | Facility: CLINIC | Age: 73
End: 2018-07-19
Payer: MEDICARE

## 2018-07-19 VITALS
HEART RATE: 72 BPM | BODY MASS INDEX: 23.86 KG/M2 | OXYGEN SATURATION: 98 % | WEIGHT: 152 LBS | SYSTOLIC BLOOD PRESSURE: 125 MMHG | TEMPERATURE: 97.4 F | DIASTOLIC BLOOD PRESSURE: 74 MMHG | HEIGHT: 67 IN

## 2018-07-19 DIAGNOSIS — Z23 ENCOUNTER FOR IMMUNIZATION: ICD-10-CM

## 2018-07-19 PROBLEM — K60.3 ANAL FISTULA: Chronic | Status: ACTIVE | Noted: 2017-05-08

## 2018-07-19 PROBLEM — K64.8 OTHER HEMORRHOIDS: Chronic | Status: ACTIVE | Noted: 2017-05-08

## 2018-07-19 PROBLEM — J18.1 LOBAR PNEUMONIA, UNSPECIFIED ORGANISM: Chronic | Status: ACTIVE | Noted: 2017-05-08

## 2018-07-19 PROBLEM — F12.20 CANNABIS DEPENDENCE, UNCOMPLICATED: Chronic | Status: ACTIVE | Noted: 2017-05-08

## 2018-07-19 LAB
BASOPHILS # BLD AUTO: 0.02 K/UL
BASOPHILS NFR BLD AUTO: 0.3 %
EOSINOPHIL # BLD AUTO: 0.11 K/UL
EOSINOPHIL NFR BLD AUTO: 1.5 %
HCT VFR BLD CALC: 51 %
HGB BLD-MCNC: 16.7 G/DL
IMM GRANULOCYTES NFR BLD AUTO: 0.1 %
LYMPHOCYTES # BLD AUTO: 3.19 K/UL
LYMPHOCYTES NFR BLD AUTO: 42.7 %
MAN DIFF?: NORMAL
MCHC RBC-ENTMCNC: 29.1 PG
MCHC RBC-ENTMCNC: 32.7 GM/DL
MCV RBC AUTO: 88.9 FL
MONOCYTES # BLD AUTO: 0.48 K/UL
MONOCYTES NFR BLD AUTO: 6.4 %
NEUTROPHILS # BLD AUTO: 3.66 K/UL
NEUTROPHILS NFR BLD AUTO: 49 %
PLATELET # BLD AUTO: 232 K/UL
RBC # BLD: 5.74 M/UL
RBC # FLD: 14.3 %
WBC # FLD AUTO: 7.47 K/UL

## 2018-07-19 PROCEDURE — 99215 OFFICE O/P EST HI 40 MIN: CPT

## 2018-08-20 ENCOUNTER — APPOINTMENT (OUTPATIENT)
Dept: INFECTIOUS DISEASE | Facility: CLINIC | Age: 73
End: 2018-08-20

## 2018-08-21 LAB
ALBUMIN SERPL ELPH-MCNC: 5 G/DL
ALP BLD-CCNC: 127 U/L
ALT SERPL-CCNC: 16 U/L
ANION GAP SERPL CALC-SCNC: 16 MMOL/L
AST SERPL-CCNC: 25 U/L
BILIRUB SERPL-MCNC: 0.7 MG/DL
BUN SERPL-MCNC: 18 MG/DL
CALCIUM SERPL-MCNC: 10.9 MG/DL
CD3 CELLS # BLD: 2335 /UL
CD3 CELLS NFR BLD: 74 %
CD3+CD4+ CELLS # BLD: 789 /UL
CD3+CD4+ CELLS NFR BLD: 25 %
CD3+CD4+ CELLS/CD3+CD8+ CLL SPEC: 0.53 RATIO
CD3+CD8+ CELLS # SPEC: 1480 /UL
CD3+CD8+ CELLS NFR BLD: 47 %
CHLORIDE SERPL-SCNC: 98 MMOL/L
CO2 SERPL-SCNC: 24 MMOL/L
CREAT SERPL-MCNC: 1.34 MG/DL
GLUCOSE SERPL-MCNC: 86 MG/DL
HIV1 RNA # SERPL NAA+PROBE: ABNORMAL
HIV1 RNA # SERPL NAA+PROBE: ABNORMAL COPIES/ML
POTASSIUM SERPL-SCNC: 5.7 MMOL/L
PROT SERPL-MCNC: 9.3 G/DL
SODIUM SERPL-SCNC: 139 MMOL/L
VIRAL LOAD INTERP: NORMAL
VIRAL LOAD LOG: ABNORMAL LG COP/ML

## 2018-09-26 ENCOUNTER — MED ADMIN CHARGE (OUTPATIENT)
Age: 73
End: 2018-09-26

## 2018-09-26 ENCOUNTER — APPOINTMENT (OUTPATIENT)
Dept: INFECTIOUS DISEASE | Facility: CLINIC | Age: 73
End: 2018-09-26
Payer: MEDICARE

## 2018-09-26 PROCEDURE — 90686 IIV4 VACC NO PRSV 0.5 ML IM: CPT

## 2018-09-26 PROCEDURE — G0008: CPT

## 2018-11-15 ENCOUNTER — FORM ENCOUNTER (OUTPATIENT)
Age: 73
End: 2018-11-15

## 2018-11-16 ENCOUNTER — RESULT CHARGE (OUTPATIENT)
Age: 73
End: 2018-11-16

## 2018-11-16 ENCOUNTER — OUTPATIENT (OUTPATIENT)
Dept: OUTPATIENT SERVICES | Facility: HOSPITAL | Age: 73
LOS: 1 days | End: 2018-11-16
Payer: MEDICARE

## 2018-11-16 ENCOUNTER — APPOINTMENT (OUTPATIENT)
Dept: CT IMAGING | Facility: IMAGING CENTER | Age: 73
End: 2018-11-16
Payer: MEDICARE

## 2018-11-16 DIAGNOSIS — Z90.49 ACQUIRED ABSENCE OF OTHER SPECIFIED PARTS OF DIGESTIVE TRACT: Chronic | ICD-10-CM

## 2018-11-16 DIAGNOSIS — R93.89 ABNORMAL FINDINGS ON DIAGNOSTIC IMAGING OF OTHER SPECIFIED BODY STRUCTURES: ICD-10-CM

## 2018-11-16 PROCEDURE — 71250 CT THORAX DX C-: CPT | Mod: 26

## 2018-11-16 PROCEDURE — 71250 CT THORAX DX C-: CPT

## 2018-11-20 ENCOUNTER — APPOINTMENT (OUTPATIENT)
Dept: PULMONOLOGY | Facility: CLINIC | Age: 73
End: 2018-11-20
Payer: MEDICARE

## 2018-11-20 VITALS
HEART RATE: 80 BPM | TEMPERATURE: 98.3 F | DIASTOLIC BLOOD PRESSURE: 75 MMHG | BODY MASS INDEX: 23.86 KG/M2 | WEIGHT: 152 LBS | SYSTOLIC BLOOD PRESSURE: 120 MMHG | RESPIRATION RATE: 17 BRPM | OXYGEN SATURATION: 97 % | HEIGHT: 67 IN

## 2018-11-20 PROCEDURE — 94729 DIFFUSING CAPACITY: CPT

## 2018-11-20 PROCEDURE — 94010 BREATHING CAPACITY TEST: CPT

## 2018-11-20 PROCEDURE — 99214 OFFICE O/P EST MOD 30 MIN: CPT | Mod: 25

## 2018-11-20 NOTE — ASSESSMENT
[FreeTextEntry1] : Mr. Pace is doing well form a pulmonary perspective. He has a history of COPD, allergy, marijuana use, abnormal CT.\par \par problem 1: lung cancer screening (new nodule 7 mm 11/16/18)\par -residual abnormality on CT c/w inflammatory disease\par -follow up chest CT in 3/2019 - if negative changes found then biopsy may be necessary\par \par problem 2: COPD \par -prior bronchitis resolved\par -on the shelf is Stiolto and QVar\par \par -Inhaler technique reviewed as well as oral hygiene techniques reviewed with patient. Avoidance of cold air, extremes of temperature, rescue inhaler should be used before exercise. Order of medication reviewed with patient. Recommended use of a cool mist humidifier in the bedroom. \par \par problem 3: allergy sinus \par -continue Olopatadine 0.6% 1 sniff each nostril up to twice daily\par -recommended to use OTC eye drops and nasal saline\par -Environmental measures for allergies were encouraged including mattress and pillow cover, air purifier, and environmental controls.\par \par \par problem 4: marijuana use\par -marijuana cessation encouraged (approximately 4 minutes) handout provided\par -recommended to get evaluated by Dr. Brenda Vera\par \par problem 5: snoring\par -recommended to use nasal saline\par -recommended positional sleep \par \par problem 6: GERD\par -diet controlled \par -Rule of 2s: avoid eating too much, eating too late, eating too spicy, eating two hours before bed\par -Things to avoid including overeating, spicy foods, tight clothing, eating within three hours of bed, this list is not all inclusive. \par -For treatment of reflux, possible options discussed including diet control, H2 blockers, PPIs, as well as coating motility agents discussed as treatment options. Timing of meals and proximity of last meal to sleep were discussed. If symptoms persist, a formal gastrointestinal evaluation is needed.\par \par problem 7: overweight (resolved)\par -Weight loss, exercise, and diet control were discussed and are highly encouraged. Treatment options were given such as, aqua therapy, and contacting a nutritionist. Recommended to use the elliptical, stationary bike, less use of treadmill.  Obesity is associated with worsening asthma, shortness of breath, and potential for cardiac disease, diabetes, and other underlying medical conditions. \par \par problem 8: health maintenance \par -s/p influenza vaccine - 2018\par -recommended strep pneumonia vaccines: Prevnar-13 vaccine, followed by Pneumo vaccine 23 one year following\par -recommended early intervention for URIs\par -recommended regular osteoporosis evaluations\par -recommended early dermatological evaluations\par -recommended after the age of 50 to the age of 70, colonoscopy every 5 years \par \par F/U in 6 months\par He is encouraged to call with any changes, concerns, or questions.

## 2018-11-20 NOTE — PROCEDURE
[FreeTextEntry1] : CAT Scan (11/16/18) reveals:\par -new 7 mm nodule in right lower lobe at the base\par -rest stable\par -3 month follow up\par \par PFT - spi reveals normal flows; FEV1 is 3.01 which is 107% of predicted, normal flow volume loop. Diffusion reads as normal with a DLCO of 23.1 which is 131% of predicted

## 2018-11-20 NOTE — ADDENDUM
[FreeTextEntry1] : All medical record entries made by lakhwinder Perdue were at Dr. Adrien Mack's, direction and personally dictated by me on (11/20/2018). I have reviewed the chart and agree that the record accurately reflects my personal performance of the history, physical exam, assessment and plan. I have also personally directed, reviewed, and agree with the discharge instructions.

## 2018-11-20 NOTE — HISTORY OF PRESENT ILLNESS
[FreeTextEntry1] : Mr. Pace is a 73 year old male with a history of abnormal chest CT, AIDS, BPH, COPD, diverticulitis, GERD, hyperlipidemia, snoring, SOB, DM, who comes in today for a follow up visit. His chief complaint is health maintenance.\par -he states that he has been feeling well except for a fever yesterday\par -he notes that he usually sleeps well\par -he reports that his bowels are regular\par -he denies any wheezing, but has minimal coughing\par -he denies any SOB\par -he states that he has been smoking marijuana\par -he denies any consistent sinus issues\par -he denies any headaches, nausea, vomiting, fever, chills, sweats, chest pain, chest pressure, diarrhea, constipation, dysphagia, dizziness, leg swelling, leg pain, itchy eyes, itchy ears, heartburn, reflux, or sour taste in the mouth.

## 2018-11-20 NOTE — REASON FOR VISIT
[Follow-Up] : a follow-up visit [FreeTextEntry1] : abnormal chest CT, AIDS, BPH, COPD, diverticulitis, GERD, hyperlipidemia, snoring, SOB, DM

## 2018-12-10 LAB
HIV1 RNA # SERPL NAA+PROBE: NORMAL
HIV1 RNA # SERPL NAA+PROBE: NORMAL COPIES/ML
VIRAL LOAD INTERP: NORMAL
VIRAL LOAD LOG: NORMAL LG COP/ML

## 2019-01-31 ENCOUNTER — MEDICATION RENEWAL (OUTPATIENT)
Age: 74
End: 2019-01-31

## 2019-02-01 ENCOUNTER — MEDICATION RENEWAL (OUTPATIENT)
Age: 74
End: 2019-02-01

## 2019-05-20 ENCOUNTER — APPOINTMENT (OUTPATIENT)
Dept: PULMONOLOGY | Facility: CLINIC | Age: 74
End: 2019-05-20
Payer: MEDICARE

## 2019-05-20 VITALS
OXYGEN SATURATION: 93 % | HEIGHT: 67 IN | WEIGHT: 151 LBS | DIASTOLIC BLOOD PRESSURE: 75 MMHG | BODY MASS INDEX: 23.7 KG/M2 | SYSTOLIC BLOOD PRESSURE: 110 MMHG | RESPIRATION RATE: 17 BRPM | HEART RATE: 61 BPM

## 2019-05-20 DIAGNOSIS — D47.2 MONOCLONAL GAMMOPATHY: ICD-10-CM

## 2019-05-20 PROCEDURE — 99214 OFFICE O/P EST MOD 30 MIN: CPT | Mod: 25

## 2019-05-20 PROCEDURE — 94010 BREATHING CAPACITY TEST: CPT

## 2019-05-20 PROCEDURE — 94729 DIFFUSING CAPACITY: CPT

## 2019-05-20 PROCEDURE — 94727 GAS DIL/WSHOT DETER LNG VOL: CPT

## 2019-05-20 PROCEDURE — ZZZZZ: CPT

## 2019-05-20 RX ORDER — OLOPATADINE HYDROCHLORIDE 665 UG/1
0.6 SPRAY, METERED NASAL
Qty: 3 | Refills: 1 | Status: ACTIVE | COMMUNITY
Start: 2019-05-20 | End: 1900-01-01

## 2019-05-20 NOTE — PROCEDURE
[FreeTextEntry1] : Full PFT revealed normal flows, with a FEV1 of 3.15L, which is 111% of predicted, normal lung volumes, and a normal diffusion of 18.7, which is 108% of predicted, with a normal flow volume loop\par \par Chest CT (5.9.19) reveals there is linear atelectasis in the right lower lobe. No lung masses or nodules. No evidence of masses or mediastinum. There are calcifications of coronary arteries.  A 12 mm gallstone.

## 2019-05-20 NOTE — ASSESSMENT
[FreeTextEntry1] : Mr. Pace is doing well form a pulmonary perspective. He has a history of COPD, AIDS, allergy, marijuana use, abnormal CT. He is stable from a pulmonary perspective. \par \par problem 1: lung cancer screening (new nodule 7 mm 11/16/18)\par -residual abnormality on CT c/w inflammatory disease\par -follow up chest CT in 2/2020\par \par problem 2: COPD \par -prior bronchitis resolved\par -on the shelf is Stiolto and QVar\par \par -Inhaler technique reviewed as well as oral hygiene techniques reviewed with patient. Avoidance of cold air, extremes of temperature, rescue inhaler should be used before exercise. Order of medication reviewed with patient. Recommended use of a cool mist humidifier in the bedroom. \par \par problem 3: allergy sinus \par -Add Clarinex 5 mg before bed\par -continue Olopatadine 0.6% 1 sniff each nostril up to twice daily\par -recommended to use OTC eye drops and nasal saline\par -Environmental measures for allergies were encouraged including mattress and pillow cover, air purifier, and environmental controls.\par \par \par problem 4: marijuana use\par -marijuana cessation encouraged (approximately 4 minutes) handout provided\par -recommended to get evaluated by Dr. Brenda Vera\par \par problem 5: snoring\par -recommended to use nasal saline\par -recommended positional sleep \par \par problem 6: GERD\par -diet controlled \par -Rule of 2s: avoid eating too much, eating too late, eating too spicy, eating two hours before bed\par -Things to avoid including overeating, spicy foods, tight clothing, eating within three hours of bed, this list is not all inclusive. \par -For treatment of reflux, possible options discussed including diet control, H2 blockers, PPIs, as well as coating motility agents discussed as treatment options. Timing of meals and proximity of last meal to sleep were discussed. If symptoms persist, a formal gastrointestinal evaluation is needed.\par \par problem 7: overweight (resolved)\par -Weight loss, exercise, and diet control were discussed and are highly encouraged. Treatment options were given such as, aqua therapy, and contacting a nutritionist. Recommended to use the elliptical, stationary bike, less use of treadmill.  Obesity is associated with worsening asthma, shortness of breath, and potential for cardiac disease, diabetes, and other underlying medical conditions. \par \par problem 8: health maintenance \par -s/p influenza vaccine - 2018\par -recommended strep pneumonia vaccines: Prevnar-13 vaccine, followed by Pneumo vaccine 23 one year following\par -recommended early intervention for URIs\par -recommended regular osteoporosis evaluations\par -recommended early dermatological evaluations\par -recommended after the age of 50 to the age of 70, colonoscopy every 5 years \par \par F/U in 6 months\par He is encouraged to call with any changes, concerns, or questions.

## 2019-05-20 NOTE — ADDENDUM
[FreeTextEntry1] : Documented by Puma Lilly acting as a scribe for Dr. Adrien Mack on 05/20/2019.\par \par All medical record entries made by the Scribe were at my, Dr. Adrien Mack's, direction and personally dictated by me on 05/20/2019. I have reviewed the chart and agree that the record accurately reflects my personal performance of the history, physical exam, assessment and plan. I have also personally directed, reviewed, and agree with the discharge instructions.\par

## 2019-05-20 NOTE — PHYSICAL EXAM
[General Appearance - Well Developed] : well developed [Normal Appearance] : normal appearance [Well Groomed] : well groomed [General Appearance - Well Nourished] : well nourished [General Appearance - In No Acute Distress] : no acute distress [No Deformities] : no deformities [Normal Conjunctiva] : the conjunctiva exhibited no abnormalities [Eyelids - No Xanthelasma] : the eyelids demonstrated no xanthelasmas [Normal Oropharynx] : normal oropharynx [II] : II [Neck Appearance] : the appearance of the neck was normal [Neck Cervical Mass (___cm)] : no neck mass was observed [Jugular Venous Distention Increased] : there was no jugular-venous distention [Thyroid Nodule] : there were no palpable thyroid nodules [Thyroid Diffuse Enlargement] : the thyroid was not enlarged [Heart Rate And Rhythm] : heart rate and rhythm were normal [Heart Sounds] : normal S1 and S2 [Murmurs] : no murmurs present [Respiration, Rhythm And Depth] : normal respiratory rhythm and effort [Exaggerated Use Of Accessory Muscles For Inspiration] : no accessory muscle use [Abdomen Soft] : soft [Abdomen Tenderness] : non-tender [Abdomen Mass (___ Cm)] : no abdominal mass palpated [Abnormal Walk] : normal gait [Gait - Sufficient For Exercise Testing] : the gait was sufficient for exercise testing [Cyanosis, Localized] : no localized cyanosis [Petechial Hemorrhages (___cm)] : no petechial hemorrhages [] : no rash [Skin Color & Pigmentation] : normal skin color and pigmentation [No Venous Stasis] : no venous stasis [Skin Lesions] : no skin lesions [No Skin Ulcers] : no skin ulcer [Deep Tendon Reflexes (DTR)] : deep tendon reflexes were 2+ and symmetric [No Xanthoma] : no  xanthoma was observed [Sensation] : the sensory exam was normal to light touch and pinprick [No Focal Deficits] : no focal deficits [Oriented To Time, Place, And Person] : oriented to person, place, and time [Impaired Insight] : insight and judgment were intact [Affect] : the affect was normal [FreeTextEntry1] : mild clubbing

## 2019-05-20 NOTE — HISTORY OF PRESENT ILLNESS
[FreeTextEntry1] : Mr. Pace is a 74 year old male with a history of abnormal chest CT, AIDS, BPH, COPD, diverticulitis, GERD, hyperlipidemia, snoring, SOB, DM, who comes in today for a follow up visit. His chief complaint is allergy. \par -he reports having allergy symptoms, with congested throat\par -he notes sleeping well, with at least 8 hours per night\par -he states his weight is currently stable\par -he notes that he exercises by walking\par -he reports that he is not SOB while walking on inclines or stairs\par -his senses of vision, smell and taste are good\par -his bowels are regular\par -he denies snoring\par -he denies denies taking any new medications, vitamins, or supplements. \par -he denies any chest pain, chest pressure, diarrhea, constipation, dysphagia, dizziness, leg swelling, leg pain, itchy eyes, itchy ears, heartburn, reflux, sour taste in the mouth, myalgias or arthralgias.

## 2019-05-22 ENCOUNTER — RX CHANGE (OUTPATIENT)
Age: 74
End: 2019-05-22

## 2019-05-22 RX ORDER — DESLORATADINE 5 MG/1
5 TABLET ORAL DAILY
Qty: 90 | Refills: 1 | Status: DISCONTINUED | COMMUNITY
Start: 2019-05-20 | End: 2019-05-22

## 2019-05-23 ENCOUNTER — APPOINTMENT (OUTPATIENT)
Dept: INFECTIOUS DISEASE | Facility: CLINIC | Age: 74
End: 2019-05-23
Payer: MEDICARE

## 2019-05-23 VITALS
WEIGHT: 150 LBS | DIASTOLIC BLOOD PRESSURE: 74 MMHG | HEIGHT: 67 IN | TEMPERATURE: 97.1 F | HEART RATE: 64 BPM | BODY MASS INDEX: 23.54 KG/M2 | SYSTOLIC BLOOD PRESSURE: 124 MMHG | OXYGEN SATURATION: 96 %

## 2019-05-23 DIAGNOSIS — Z23 ENCOUNTER FOR IMMUNIZATION: ICD-10-CM

## 2019-05-23 LAB
ALBUMIN SERPL ELPH-MCNC: 5.1 G/DL
ALP BLD-CCNC: 108 U/L
ALT SERPL-CCNC: 28 U/L
AMYLASE/CREAT SERPL: 109 U/L
ANION GAP SERPL CALC-SCNC: 16 MMOL/L
APPEARANCE: CLEAR
AST SERPL-CCNC: 27 U/L
BACTERIA: NEGATIVE
BASOPHILS # BLD AUTO: 0.05 K/UL
BASOPHILS NFR BLD AUTO: 1 %
BILIRUB SERPL-MCNC: 0.8 MG/DL
BILIRUBIN URINE: NEGATIVE
BLOOD URINE: NEGATIVE
BUN SERPL-MCNC: 15 MG/DL
CALCIUM SERPL-MCNC: 10.5 MG/DL
CD3 CELLS # BLD: 1411 /UL
CD3 CELLS NFR BLD: 75 %
CD3+CD4+ CELLS # BLD: 526 /UL
CD3+CD4+ CELLS NFR BLD: 28 %
CD3+CD4+ CELLS/CD3+CD8+ CLL SPEC: 0.66 RATIO
CD3+CD8+ CELLS # SPEC: 799 /UL
CD3+CD8+ CELLS NFR BLD: 43 %
CHLORIDE SERPL-SCNC: 99 MMOL/L
CHOLEST SERPL-MCNC: 139 MG/DL
CHOLEST/HDLC SERPL: 3.8 RATIO
CO2 SERPL-SCNC: 23 MMOL/L
COLOR: YELLOW
CREAT SERPL-MCNC: 1.02 MG/DL
EOSINOPHIL # BLD AUTO: 0.13 K/UL
EOSINOPHIL NFR BLD AUTO: 2.5 %
ERYTHROCYTE [SEDIMENTATION RATE] IN BLOOD BY WESTERGREN METHOD: 29 MM/HR
ESTIMATED AVERAGE GLUCOSE: 105 MG/DL
GLUCOSE QUALITATIVE U: NEGATIVE
GLUCOSE SERPL-MCNC: 103 MG/DL
HBA1C MFR BLD HPLC: 5.3 %
HCT VFR BLD CALC: 47.8 %
HDLC SERPL-MCNC: 37 MG/DL
HGB BLD-MCNC: 14.2 G/DL
HYALINE CASTS: 1 /LPF
IMM GRANULOCYTES NFR BLD AUTO: 0.2 %
KETONES URINE: NEGATIVE
LDLC SERPL CALC-MCNC: 53 MG/DL
LEUKOCYTE ESTERASE URINE: NEGATIVE
LYMPHOCYTES # BLD AUTO: 2.02 K/UL
LYMPHOCYTES NFR BLD AUTO: 39.4 %
MAN DIFF?: NORMAL
MCHC RBC-ENTMCNC: 28.3 PG
MCHC RBC-ENTMCNC: 29.7 GM/DL
MCV RBC AUTO: 95.4 FL
MICROSCOPIC-UA: NORMAL
MONOCYTES # BLD AUTO: 0.31 K/UL
MONOCYTES NFR BLD AUTO: 6 %
NEUTROPHILS # BLD AUTO: 2.61 K/UL
NEUTROPHILS NFR BLD AUTO: 50.9 %
NITRITE URINE: NEGATIVE
PH URINE: 6
PHOSPHATE SERPL-MCNC: 3.4 MG/DL
PLATELET # BLD AUTO: 241 K/UL
POTASSIUM SERPL-SCNC: 5.4 MMOL/L
PROT SERPL-MCNC: 9.2 G/DL
PROTEIN URINE: NEGATIVE
PSA SERPL-MCNC: 2.42 NG/ML
RBC # BLD: 5.01 M/UL
RBC # FLD: 14.4 %
RED BLOOD CELLS URINE: 0 /HPF
SODIUM SERPL-SCNC: 138 MMOL/L
SPECIFIC GRAVITY URINE: 1.02
SQUAMOUS EPITHELIAL CELLS: 0 /HPF
TRIGL SERPL-MCNC: 247 MG/DL
UROBILINOGEN URINE: NORMAL
WBC # FLD AUTO: 5.13 K/UL
WHITE BLOOD CELLS URINE: 0 /HPF

## 2019-05-23 PROCEDURE — 99212 OFFICE O/P EST SF 10 MIN: CPT

## 2019-05-23 RX ORDER — FLUOROURACIL 50 MG/G
5 CREAM TOPICAL
Qty: 40 | Refills: 0 | Status: DISCONTINUED | COMMUNITY
Start: 2017-10-18 | End: 2019-05-23

## 2019-05-23 RX ORDER — EMTRICITABINE AND TENOFOVIR ALAFENAMIDE 200; 25 MG/1; MG/1
200-25 TABLET ORAL
Qty: 30 | Refills: 5 | Status: DISCONTINUED | COMMUNITY
Start: 2017-01-10 | End: 2019-05-23

## 2019-05-23 NOTE — HISTORY OF PRESENT ILLNESS
[Sexually Active] : The patient is sexually active [Condom Use] : using condoms [Condom Use - Some Encounters] : for some encounters [FreeTextEntry1] : 74yoM with HIV/AIDS (dx 2016, CD4 leslie 130), HTN, HLA, COPD, GERD, DM, daily marijuana use, here for f/u.  Virally suppressed on Biktarvy, no missed doses.  No reported side effects. \par \par Feeling well today, no complaints. \par Started taking spirulina tab daily.  \par Followed by pulmonary Dr. Mack, repeat chest CT 2020\par Has colonoscopy scheduled for 6/18/19\par Dental appt pending \par Completed a second HepB series with PCP last year.  \par Smokes marijuana daily. Former cigarette smoker quit 40 years ago.  Drinks a few times per week\par Lives in [place] Nov-May. Wife stays in [place] most of the time.\par \par Pt sometimes has male partners while in [place], meets at club. Occasionally uses condoms.  Oral/anal verse  [Monogamous] : is not monogamous [de-identified] : Not sexually active with wife. Casual male partners [de-identified] : retired [de-identified] : wife HIV neg aware of pt status

## 2019-05-23 NOTE — DATA REVIEWED
[FreeTextEntry1] : 3/30/18 WZ8=466(30%), VL <30\par 9/13/17 LP6=693(25%), VL nd\par 5/10/17 VX3=827(28%), VL nd\par 1/10/17 UA5=049(22%), VL nd\par 10/6/16 XS6=237(14%), VL nd\par 7/21/16 MG5=099(17%), VL nd\par 5/20/16 PO3=090(14%), VL 79\par 4/6/16 baseline [place]6=681(10%), VL [id number]

## 2019-05-23 NOTE — REVIEW OF SYSTEMS
[Normal Appetite] : normal appetite [Negative] : Heme/Lymph [___ # of Missed Doses in The Past Week] : [unfilled] doses missed in the past week  [Fever] : no fever [Chills] : no chills [Body Aches] : no body aches [Difficulty Sleeping] : no difficulty sleeping [Feeling Sick] : not feeling sick [Feeling Tired] : not feeling tired

## 2019-05-23 NOTE — ASSESSMENT
[Treatment Education] : treatment education [Treatment Adherence] : treatment adherence [Rx Dose / Side Effects] : Rx dose/side effects [Risk Reduction] : risk reduction [Nutritional / Food Issues] : nutritional/food issues [Universal Precautions] : universal precautions [Medical Care Issues] : medical care issues [HIV Education] : HIV Education [Sexuality / Safer Sex] : sexuality/safer sex [Partner Notification Info/Discussion] : partner notification info/discussion [FreeTextEntry1] : 74yoM with HIV/AIDS (dx 2016, CD4 leslie 130), HTN, HLA, COPD, GERD, DM, daily marijuana use, here for f/u. \par Colorectal Dr. Barger - requesting ESR, amylase today prior to colonocopy\par Pulmonology Dr. Mack\par PCP Dr. Dumont\par \par 1) HIV:  Well-controlled.  Continue Biktarvy PO daily.  Reviewed dosing, side effects. Encouraged adherence. Monitor serum cr.  Cont CD4/VL monitoring.  Reviewed HIV prevention methods including TasP, U=U.  Encouraged condoms to prevent STIs.\par \par 2) HCM\par Vaccines: Check HepB sAb today.  HepA#1 today\par Annual labs\par 3site gc/ct and anal pap today\par Colonoscopy 2014 per pt\par Dental 2018, pending appt\par Eye exam UTD\par \par RTC 6 months f/u

## 2019-05-23 NOTE — PHYSICAL EXAM
[General Appearance - Alert] : alert [General Appearance - In No Acute Distress] : in no acute distress [General Appearance - Well Nourished] : well nourished [General Appearance - Well-Appearing] : healthy appearing [Sclera] : the sclera and conjunctiva were normal [Examination Of The Oral Cavity] : the lips and gums were normal [Oropharynx] : the oropharynx was normal with no thrush [Respiration, Rhythm And Depth] : normal respiratory rhythm and effort [Auscultation Breath Sounds / Voice Sounds] : lungs were clear to auscultation bilaterally [Heart Rate And Rhythm] : heart rate was normal and rhythm regular [Heart Sounds] : normal S1 and S2 [No Palpable Adenopathy] : no palpable adenopathy [Musculoskeletal - Swelling] : no joint swelling [Range of Motion to Joints] : range of motion to joints [Motor Tone] : muscle strength and tone were normal [Skin Color & Pigmentation] : normal skin color and pigmentation [Skin Lesions] : no skin lesions [Oriented To Time, Place, And Person] : oriented to person, place, and time [Affect] : the affect was normal [Outer Ear] : the ears and nose were normal in appearance [Both Tympanic Membranes Were Examined] : both tympanic membranes were normal [Bowel Sounds] : normal bowel sounds [Abdomen Soft] : soft [Abdomen Tenderness] : non-tender [] : no hepato-splenomegaly [Abdomen Mass (___ Cm)] : no abdominal mass palpated [Costovertebral Angle Tenderness] : no CVA tenderness [No Focal Deficits] : no focal deficits

## 2019-06-19 ENCOUNTER — RX RENEWAL (OUTPATIENT)
Age: 74
End: 2019-06-19

## 2019-06-27 ENCOUNTER — APPOINTMENT (OUTPATIENT)
Dept: SURGERY | Facility: CLINIC | Age: 74
End: 2019-06-27
Payer: MEDICARE

## 2019-06-27 VITALS
OXYGEN SATURATION: 95 % | HEART RATE: 84 BPM | SYSTOLIC BLOOD PRESSURE: 136 MMHG | DIASTOLIC BLOOD PRESSURE: 75 MMHG | RESPIRATION RATE: 15 BRPM | TEMPERATURE: 97.9 F

## 2019-06-27 PROCEDURE — 99214 OFFICE O/P EST MOD 30 MIN: CPT | Mod: 25

## 2019-06-27 PROCEDURE — 46600 DIAGNOSTIC ANOSCOPY SPX: CPT

## 2019-06-27 RX ORDER — OLOPATADINE HYDROCHLORIDE 2 MG/ML
0.2 SOLUTION OPHTHALMIC
Qty: 3 | Refills: 1 | Status: DISCONTINUED | COMMUNITY
Start: 2018-06-05 | End: 2019-06-27

## 2019-06-27 RX ORDER — LEVOCETIRIZINE DIHYDROCHLORIDE 5 MG/1
5 TABLET ORAL
Qty: 1 | Refills: 1 | Status: DISCONTINUED | COMMUNITY
Start: 2019-05-22 | End: 2019-06-27

## 2019-06-27 RX ORDER — METHYLSULFONYLMETHANE 1000 MG
TABLET ORAL
Refills: 0 | Status: ACTIVE | COMMUNITY

## 2019-06-27 RX ORDER — MULTIVITAMIN
TABLET ORAL
Refills: 0 | Status: ACTIVE | COMMUNITY

## 2019-06-27 RX ORDER — VITAMIN E ACID SUCCINATE 268 MG
TABLET ORAL
Refills: 0 | Status: ACTIVE | COMMUNITY

## 2019-06-27 RX ORDER — OLOPATADINE HYDROCHLORIDE 665 UG/1
0.6 SPRAY, METERED NASAL
Qty: 3 | Refills: 1 | Status: DISCONTINUED | COMMUNITY
Start: 2018-06-05 | End: 2019-06-27

## 2019-06-27 RX ORDER — SERTRALINE 25 MG/1
25 TABLET, FILM COATED ORAL
Refills: 0 | Status: ACTIVE | COMMUNITY

## 2019-06-27 NOTE — ASSESSMENT
[FreeTextEntry1] : Patient reports that he has anal receptive sex and that he had infectious proctitis a couple months ago requiring antibiotic treatment.  He has some squamous metaplasia and the findings at the recent colonoscopy could be residual healing after the infectious proctitis and/or a consequence of rectal manipulation. I recommended that he followup with GI for a repeat sigmoidoscopy and biopsies in 3 months.

## 2019-06-27 NOTE — PHYSICAL EXAM
[FreeTextEntry1] : Perianal inspection unremarkable. Fair resting tone on digital exam. Anoscopy demonstrated findings consistent with squamous metaplasia in the anterior rectum.

## 2019-06-27 NOTE — HISTORY OF PRESENT ILLNESS
[FreeTextEntry1] : Patient is s/p posterior intersphincteric fistulotomy and hemorrhoidectomy on 5/9/17. Colonoscopy from 6/18/19 demonstrated questionable squamous metaplasia in the rectum. Biopsied. Pathology: Inflamed anorectal mucosa with ulceration and mild architectural changes; fragments of inflamed granulation tissue and fibrinopurulent exudate. No viral cytopathic effects, granulomata, parasites, dysplasia or carcinoma present. \par \par Today, patient reports feeling well. Denies rectal pain. Had rectal bleeding one month ago. Patient was in [place] at that time and was given an antibiotic. Bleeding has now resolved. Has normal formed BM.  Patient reports that he has anal receptive sex and that he had infectious proctitis a couple of months ago.

## 2019-07-01 LAB
25(OH)D3 SERPL-MCNC: 44.6 NG/ML
ANAL PAP CYTOLOGY: NORMAL
C TRACH RRNA SPEC QL NAA+PROBE: NOT DETECTED
HBV SURFACE AB SER QL: ABNORMAL
HCV AB SER QL: NONREACTIVE
HCV S/CO RATIO: 0.13 S/CO
HIV1 RNA # SERPL NAA+PROBE: ABNORMAL
HIV1 RNA # SERPL NAA+PROBE: ABNORMAL COPIES/ML
M TB IFN-G BLD-IMP: NEGATIVE
N GONORRHOEA RRNA SPEC QL NAA+PROBE: NOT DETECTED
QUANTIFERON TB PLUS MITOGEN MINUS NIL: 7.74 IU/ML
QUANTIFERON TB PLUS NIL: 0.1 IU/ML
QUANTIFERON TB PLUS TB1 MINUS NIL: -0.01 IU/ML
QUANTIFERON TB PLUS TB2 MINUS NIL: -0.04 IU/ML
SOURCE AMPLIFICATION: NORMAL
SOURCE ANAL: NORMAL
SOURCE ORAL: NORMAL
T PALLIDUM AB SER QL IA: NEGATIVE
VIRAL LOAD INTERP: NORMAL
VIRAL LOAD LOG: ABNORMAL LG COP/ML

## 2019-11-04 ENCOUNTER — APPOINTMENT (OUTPATIENT)
Dept: INFECTIOUS DISEASE | Facility: CLINIC | Age: 74
End: 2019-11-04
Payer: MEDICARE

## 2019-11-04 VITALS
HEIGHT: 67 IN | HEART RATE: 70 BPM | OXYGEN SATURATION: 97 % | WEIGHT: 161 LBS | BODY MASS INDEX: 25.27 KG/M2 | SYSTOLIC BLOOD PRESSURE: 150 MMHG | DIASTOLIC BLOOD PRESSURE: 79 MMHG | TEMPERATURE: 97.6 F

## 2019-11-04 LAB
BASOPHILS # BLD AUTO: 0.04 K/UL
BASOPHILS NFR BLD AUTO: 0.7 %
EOSINOPHIL # BLD AUTO: 0.1 K/UL
EOSINOPHIL NFR BLD AUTO: 1.7 %
HCT VFR BLD CALC: 49.5 %
HGB BLD-MCNC: 15.3 G/DL
IMM GRANULOCYTES NFR BLD AUTO: 0.2 %
LYMPHOCYTES # BLD AUTO: 2.23 K/UL
LYMPHOCYTES NFR BLD AUTO: 37 %
MAN DIFF?: NORMAL
MCHC RBC-ENTMCNC: 29.4 PG
MCHC RBC-ENTMCNC: 30.9 GM/DL
MCV RBC AUTO: 95.2 FL
MONOCYTES # BLD AUTO: 0.31 K/UL
MONOCYTES NFR BLD AUTO: 5.1 %
NEUTROPHILS # BLD AUTO: 3.34 K/UL
NEUTROPHILS NFR BLD AUTO: 55.3 %
PLATELET # BLD AUTO: 250 K/UL
RBC # BLD: 5.2 M/UL
RBC # FLD: 13.3 %
WBC # FLD AUTO: 6.03 K/UL

## 2019-11-04 PROCEDURE — 99214 OFFICE O/P EST MOD 30 MIN: CPT

## 2019-11-04 NOTE — ASSESSMENT
[FreeTextEntry1] : 11/4/2019------74 yoM with HIV/AIDS (dx 2016, CD4 leslie 130), HTN, HLA, COPD, GERD, DM, daily marijuana use, here for f/u. Virally suppressed on Biktarvy, no missed doses. No reported side effects. Pt is going this Friday 11/8/2019 for bladder biopsy to rule out cancer. Doing great with HIV. Pt has his own PCP in [place]. labs today see 6 months.  [Treatment Education] : treatment education [Treatment Adherence] : treatment adherence [Drug Interactions / Side Effects] : drug interactions/side effects [HIV Education] : HIV Education [Anticipatory Guidance] : anticipatory guidance

## 2019-11-04 NOTE — HISTORY OF PRESENT ILLNESS
[FreeTextEntry1] : History of Present Illness\par 2019------74 yoM with HIV/AIDS (dx 2016, CD4 leslie 130), HTN, HLA, COPD, GERD, DM, daily marijuana use, here for f/u. Virally suppressed on Biktarvy, no missed doses. No reported side effects. Pt is going this 2019 for bladder biopsy to rule out cancer. Doing great with HIV. Pt has his own PCP in [place]. labs today see 6 months. \par \par Feeling well today, no complaints. \par Started taking spirulina tab daily. \par Followed by pulmonary Dr. Mack, repeat chest CT \par Has colonoscopy scheduled for 19\par Dental appt pending \par Completed a second HepB series with PCP last year. \par Smokes marijuana daily. Former cigarette smoker quit 40 years ago. Drinks a few times per week\par Lives in [place]-May. Wife stays in [place] most of the time.\par \par Pt sometimes has male partners while in [place], meets at club. Occasionally uses condoms. Oral/anal verse \par \par Sexual History: Not sexually active with wife. Casual male partners The patient is sexually active and is not monogamous. The patient is using condoms for some encounters. \par Occupation: retired. \par Lives with wife HIV neg aware of pt status. \par  \par Active Problems\par Abnormal chest CT (793.2) (R93.89)\par Adjustment disorder, unspecified type (309.9) (F43.20)\par AIDS (042) (B20)\par Anal fistula (565.1) (K60.3)\par Anal skin tag (455.9) (K64.4)\par BPH (benign prostatic hypertrophy) (600.00) (N40.0)\par Chronic obstructive pulmonary disease, unspecified COPD type (496) (J44.9)\par Diverticulitis of colon (562.11) (K57.32)\par Erectile dysfunction (607.84) (N52.9)\par Gastroesophageal reflux disease with esophagitis (530.11) (K21.0)\par Hemorrhoid (455.6) (K64.9)\par HIV disease (042) (B20)\par Hyperlipidemia (272.4) (E78.5)\par IgA monoclonal gammopathy (273.1) (D47.2)\par Marijuana smoker (305.20) (F12.90)\par Muscle cramp, nocturnal (729.82) (R25.2)\par Need for hepatitis B vaccination (V05.3) (Z23)\par Rectal bleeding (569.3) (K62.5)\par Snoring (786.09) (R06.83)\par SOB (shortness of breath) (786.05) (R06.02)\par Type 2 diabetes mellitus (250.00) (E11.9)\par \par Past Medical History\par History of Cough (786.2) (R05)\par History of CD4 count (V15.89) (Z92.89)\par History of esophageal reflux (V12.79) (Z87.19)\par History of herpes zoster (V12.09) (Z86.19)\par History of hypertension (V12.59) (Z86.79)\par History of shortness of breath (V13.89) (Z87.898)\par History of Internal hemorrhoids (455.0) (K64.8)\par History of Loss of appetite (783.0) (R63.0)\par History of Pneumonia of right lower lobe due to infectious organism (486) (J18.1)\par History of Thrombosed hemorrhoids (455.7) (K64.5)\par History of Visit for dental examination (V72.2) (Z01.20)\par History of Visit for eye and vision exam (V72.0) (Z01.00)\par History of Weight loss, unintentional (783.21) (R63.4)\par \par \par \par \par \par \par \par \par \par \par \par History of Visit for eye and vision exam (V72.0) (Z01.00)\par    \par \par \par History of Visit for eye and vision exam (V72.0) (Z01.00)\par     \par \par Surgical History\par History of Anal Fistulectomy\par History of Appendectomy\par History of Hemorrhoidectomy\par History of Hemorrhoidectomy By Rubber Band Ligation\par \par Family History\par Family history of  : Mother, Father\par Family history of death of natural cause (V19.8) (Z84.89) : Mother\par Denied: Family history of malignant neoplasm\par Family history of Old age : Mother, Father\par \par Social History\par Former smoker (V15.82) (Z87.891)\par  · quit approx \par Born in [place]\par HIV Risk Factors: Bisexual contact\par Marijuana\par Marital History - Currently \par Moderate alcohol use\par Occupation\par Denied: History of Pets in the home\par \par Current Meds\par Aspirin 81 MG TABS\par Atorvastatin Calcium 20 MG Oral Tablet; take 1 tablet by mouth at bedtime\par Biktarvy -25 MG Oral Tablet; Take 1 tablet daily\par hydroCHLOROthiazide 12.5 MG Oral Capsule\par Levocetirizine Dihydrochloride 5 MG Oral Tablet; take 1 tablet by mouth at bedtime\par Olopatadine HCl - 0.2 % Ophthalmic Solution; INSTILL 1 DROP INTO AFFECTED EYE(S)\par ONCE DAILY AS DIRECTED\par Olopatadine HCl - 0.6 % Nasal Solution; one sniff each nostril b.i.d\par Olopatadine HCl - 0.6 % Nasal Solution; one sniff each nostril b.i.d\par Sertraline HCl - 50 MG Oral Tablet\par Sildenafil Citrate 100 MG Oral Tablet; TAKE 1 TABLET DAILY 1 HOUR BEFORE NEEDED\par Vitamin B-12 100 MCG Oral Tablet\par Zetia 10 MG Oral Tablet\par \par Allergies\par Penicillins\par abacavir\par \par

## 2019-11-05 LAB
25(OH)D3 SERPL-MCNC: 38.7 NG/ML
AFP-TM SERPL-MCNC: <1.8 NG/ML
ALBUMIN SERPL ELPH-MCNC: 4.9 G/DL
ALP BLD-CCNC: 95 U/L
ALT SERPL-CCNC: 21 U/L
ANION GAP SERPL CALC-SCNC: 14 MMOL/L
APPEARANCE: CLEAR
AST SERPL-CCNC: 22 U/L
BACTERIA: NEGATIVE
BILIRUB SERPL-MCNC: 0.8 MG/DL
BILIRUBIN URINE: NEGATIVE
BLOOD URINE: NEGATIVE
BUN SERPL-MCNC: 14 MG/DL
CALCIUM SERPL-MCNC: 9.7 MG/DL
CD3 CELLS # BLD: 1496 /UL
CD3 CELLS NFR BLD: 73 %
CD3+CD4+ CELLS # BLD: 573 /UL
CD3+CD4+ CELLS NFR BLD: 28 %
CD3+CD4+ CELLS/CD3+CD8+ CLL SPEC: 0.69 RATIO
CD3+CD8+ CELLS # SPEC: 834 /UL
CD3+CD8+ CELLS NFR BLD: 41 %
CHLORIDE SERPL-SCNC: 99 MMOL/L
CO2 SERPL-SCNC: 24 MMOL/L
COLOR: YELLOW
CREAT SERPL-MCNC: 1.06 MG/DL
ESTIMATED AVERAGE GLUCOSE: 108 MG/DL
GLUCOSE QUALITATIVE U: NEGATIVE
GLUCOSE SERPL-MCNC: 108 MG/DL
HBA1C MFR BLD HPLC: 5.4 %
HIV1 RNA # SERPL NAA+PROBE: ABNORMAL
HIV1 RNA # SERPL NAA+PROBE: ABNORMAL COPIES/ML
HYALINE CASTS: 0 /LPF
KETONES URINE: NEGATIVE
LEUKOCYTE ESTERASE URINE: NEGATIVE
MICROSCOPIC-UA: NORMAL
NITRITE URINE: NEGATIVE
PH URINE: 6
POTASSIUM SERPL-SCNC: 4.7 MMOL/L
PROT SERPL-MCNC: 8.1 G/DL
PROTEIN URINE: NEGATIVE
PSA SERPL-MCNC: 5.34 NG/ML
RED BLOOD CELLS URINE: 1 /HPF
SODIUM SERPL-SCNC: 137 MMOL/L
SPECIFIC GRAVITY URINE: 1.02
SQUAMOUS EPITHELIAL CELLS: 0 /HPF
T PALLIDUM AB SER QL IA: NEGATIVE
UROBILINOGEN URINE: NORMAL
VIRAL LOAD INTERP: NORMAL
VIRAL LOAD LOG: ABNORMAL LG COP/ML
WHITE BLOOD CELLS URINE: 5 /HPF

## 2019-11-06 LAB
M TB IFN-G BLD-IMP: NEGATIVE
QUANTIFERON TB PLUS MITOGEN MINUS NIL: 4.68 IU/ML
QUANTIFERON TB PLUS NIL: 0.23 IU/ML
QUANTIFERON TB PLUS TB1 MINUS NIL: -0.08 IU/ML
QUANTIFERON TB PLUS TB2 MINUS NIL: -0.09 IU/ML

## 2019-11-11 ENCOUNTER — APPOINTMENT (OUTPATIENT)
Dept: PULMONOLOGY | Facility: CLINIC | Age: 74
End: 2019-11-11
Payer: MEDICARE

## 2019-11-11 ENCOUNTER — NON-APPOINTMENT (OUTPATIENT)
Age: 74
End: 2019-11-11

## 2019-11-11 VITALS
RESPIRATION RATE: 17 BRPM | WEIGHT: 158 LBS | HEIGHT: 66 IN | HEART RATE: 60 BPM | SYSTOLIC BLOOD PRESSURE: 120 MMHG | DIASTOLIC BLOOD PRESSURE: 68 MMHG | BODY MASS INDEX: 25.39 KG/M2 | OXYGEN SATURATION: 97 %

## 2019-11-11 DIAGNOSIS — F12.90 CANNABIS USE, UNSPECIFIED, UNCOMPLICATED: ICD-10-CM

## 2019-11-11 PROCEDURE — 94010 BREATHING CAPACITY TEST: CPT

## 2019-11-11 PROCEDURE — 99214 OFFICE O/P EST MOD 30 MIN: CPT | Mod: 25

## 2019-11-11 RX ORDER — MONTELUKAST 10 MG/1
10 TABLET, FILM COATED ORAL
Qty: 1 | Refills: 0 | Status: ACTIVE | COMMUNITY
Start: 2019-11-11 | End: 1900-01-01

## 2019-11-11 RX ORDER — DESLORATADINE 5 MG/1
5 TABLET ORAL DAILY
Qty: 90 | Refills: 1 | Status: ACTIVE | COMMUNITY
Start: 2019-11-11 | End: 1900-01-01

## 2019-11-11 RX ORDER — CICLESONIDE 50 UG/1
50 SPRAY NASAL
Qty: 3 | Refills: 1 | Status: ACTIVE | COMMUNITY
Start: 2019-11-11 | End: 1900-01-01

## 2019-11-11 NOTE — ADDENDUM
[FreeTextEntry1] : Documented by Puma Lilly acting as a scribe for Dr. Adrien Mack on 11/11/2019.\par \par All medical record entries made by the Scribe were at my, Dr. Adrien Mack's, direction and personally dictated by me on 11/11/2019. I have reviewed the chart and agree that the record accurately reflects my personal performance of the history, physical exam, assessment and plan. I have also personally directed, reviewed, and agree with the discharge instructions.

## 2019-11-11 NOTE — ASSESSMENT
[FreeTextEntry1] : Mr. Pace is doing well form a pulmonary perspective. He has a history of COPD, AIDS, allergy, marijuana use, abnormal CT. He is stable from a pulmonary perspective. His number one issue is awaiting prostate biopsy results. \par \par problem 1: lung cancer screening (new nodule 7 mm 11/16/18)\par -residual abnormality on CT c/w inflammatory disease\par -follow up chest CT in 2/2020\par \par problem 2: COPD \par -prior bronchitis resolved\par -on the shelf is Stiolto and QVar\par \par -Inhaler technique reviewed as well as oral hygiene techniques reviewed with patient. Avoidance of cold air, extremes of temperature, rescue inhaler should be used before exercise. Order of medication reviewed with patient. Recommended use of a cool mist humidifier in the bedroom. \par \par problem 3: allergy sinus \par -continue Clarinex 5 mg before bed\par -Add Singulair 10 mg before bed \par -Add Omnaris 1 sniff BID\par -recommended to use OTC eye drops and nasal saline\par -Environmental measures for allergies were encouraged including mattress and pillow cover, air purifier, and environmental controls.\par \par \par problem 4: marijuana use\par -marijuana cessation encouraged (approximately 4 minutes) handout provided\par -recommended to get evaluated by Dr. Brenda Vera\par \par problem 5: snoring\par -recommended to use nasal saline\par -recommended positional sleep \par \par problem 6: GERD\par -diet controlled \par -Rule of 2s: avoid eating too much, eating too late, eating too spicy, eating two hours before bed\par -Things to avoid including overeating, spicy foods, tight clothing, eating within three hours of bed, this list is not all inclusive. \par -For treatment of reflux, possible options discussed including diet control, H2 blockers, PPIs, as well as coating motility agents discussed as treatment options. Timing of meals and proximity of last meal to sleep were discussed. If symptoms persist, a formal gastrointestinal evaluation is needed.\par \par problem 7: overweight (resolved)\par -Weight loss, exercise, and diet control were discussed and are highly encouraged. Treatment options were given such as, aqua therapy, and contacting a nutritionist. Recommended to use the elliptical, stationary bike, less use of treadmill.  Obesity is associated with worsening asthma, shortness of breath, and potential for cardiac disease, diabetes, and other underlying medical conditions. \par \par problem 8: health maintenance \par -s/p influenza vaccine - 2019\par -recommended strep pneumonia vaccines: Prevnar-13 vaccine, followed by Pneumo vaccine 23 one year following\par -recommended early intervention for URIs\par -recommended regular osteoporosis evaluations\par -recommended early dermatological evaluations\par -recommended after the age of 50 to the age of 70, colonoscopy every 5 years \par \par F/U in 6 months with SPI and DLCO\par He is encouraged to call with any changes, concerns, or questions.

## 2019-11-11 NOTE — HISTORY OF PRESENT ILLNESS
[FreeTextEntry1] : Mr. Pace is a 74 year old male with a history of abnormal chest CT, AIDS, BPH, COPD, diverticulitis, GERD, hyperlipidemia, snoring, SOB, DM, who comes in today for a follow up visit. His chief complaint is \par -he reports feeling generally well\par -he notes his nasal spray didn't work for him, despite using it for about 1 year\par -he is s/p prostate biopsy, and results are pending\par -he notes his memory isn't as sharp as he would like\par -he reports having some dizziness occasionally\par -he notes his energy level is 8/10\par -he states his medial lower leg feels weak\par -he notes he isn't exercising as much as he would like\par -he reports he is still smoking marijuana, and feels better in general on it\par -he denies taking any new medications, vitamins, or supplements. \par -he notes he snores occasionally\par -he denies any chest pain, chest pressure, diarrhea, constipation, dysphagia, sour taste in the mouth, leg swelling, leg pain, heartburn, reflux

## 2019-11-11 NOTE — PROCEDURE
[FreeTextEntry1] : PFT revealed normal flows, with a FEV1 of 2.96L, which is 113% of predicted, with a normal flow volume loop \par \par Chest CT (2.18.19) reveals there is linear atelectasis in the right lower lobe. No lung masses or nodules. No evidence of masses or mediastinum.  There are calcifications of coronary arteries. A 12 mm gallstone.

## 2019-11-11 NOTE — REASON FOR VISIT
[Follow-Up] : a follow-up visit [FreeTextEntry1] : abnormal chest CT, AIDS, COPD, GERD, snoring, SOB

## 2019-11-11 NOTE — PHYSICAL EXAM
[Normal Appearance] : normal appearance [General Appearance - Well Developed] : well developed [Well Groomed] : well groomed [General Appearance - Well Nourished] : well nourished [General Appearance - In No Acute Distress] : no acute distress [No Deformities] : no deformities [Normal Conjunctiva] : the conjunctiva exhibited no abnormalities [Normal Oropharynx] : normal oropharynx [Eyelids - No Xanthelasma] : the eyelids demonstrated no xanthelasmas [III] : III [Neck Appearance] : the appearance of the neck was normal [Jugular Venous Distention Increased] : there was no jugular-venous distention [Neck Cervical Mass (___cm)] : no neck mass was observed [Thyroid Diffuse Enlargement] : the thyroid was not enlarged [Thyroid Nodule] : there were no palpable thyroid nodules [Heart Sounds] : normal S1 and S2 [Heart Rate And Rhythm] : heart rate and rhythm were normal [Murmurs] : no murmurs present [Exaggerated Use Of Accessory Muscles For Inspiration] : no accessory muscle use [Respiration, Rhythm And Depth] : normal respiratory rhythm and effort [Auscultation Breath Sounds / Voice Sounds] : lungs were clear to auscultation bilaterally [Abdomen Soft] : soft [Abdomen Tenderness] : non-tender [Abdomen Mass (___ Cm)] : no abdominal mass palpated [Abnormal Walk] : normal gait [Gait - Sufficient For Exercise Testing] : the gait was sufficient for exercise testing [Cyanosis, Localized] : no localized cyanosis [Petechial Hemorrhages (___cm)] : no petechial hemorrhages [] : no rash [Skin Color & Pigmentation] : normal skin color and pigmentation [No Venous Stasis] : no venous stasis [Skin Lesions] : no skin lesions [No Skin Ulcers] : no skin ulcer [No Xanthoma] : no  xanthoma was observed [Deep Tendon Reflexes (DTR)] : deep tendon reflexes were 2+ and symmetric [Sensation] : the sensory exam was normal to light touch and pinprick [No Focal Deficits] : no focal deficits [Impaired Insight] : insight and judgment were intact [Oriented To Time, Place, And Person] : oriented to person, place, and time [Affect] : the affect was normal [FreeTextEntry1] : mild clubbing

## 2019-11-11 NOTE — REVIEW OF SYSTEMS
[Nasal Congestion] : nasal congestion [Postnasal Drip] : postnasal drip [Sinus Problems] : sinus problems [Dizziness] : dizziness [Snoring] : snoring [As Noted in HPI] : as noted in HPI [Negative] : Pulmonary Hypertension [Chest Discomfort] : no chest discomfort [Reflux] : no reflux [Dysphagia] : no dysphagia [Diarrhea] : no diarrhea [Constipation] : no constipation

## 2019-12-02 ENCOUNTER — APPOINTMENT (OUTPATIENT)
Dept: INFECTIOUS DISEASE | Facility: CLINIC | Age: 74
End: 2019-12-02
Payer: MEDICARE

## 2019-12-02 PROCEDURE — 90471 IMMUNIZATION ADMIN: CPT

## 2019-12-02 PROCEDURE — 90632 HEPA VACCINE ADULT IM: CPT | Mod: GY

## 2020-05-03 ENCOUNTER — FORM ENCOUNTER (OUTPATIENT)
Age: 75
End: 2020-05-03

## 2020-05-04 ENCOUNTER — APPOINTMENT (OUTPATIENT)
Dept: INFECTIOUS DISEASE | Facility: CLINIC | Age: 75
End: 2020-05-04
Payer: MEDICARE

## 2020-05-04 PROCEDURE — 99442: CPT | Mod: CR

## 2020-05-12 ENCOUNTER — APPOINTMENT (OUTPATIENT)
Dept: PULMONOLOGY | Facility: CLINIC | Age: 75
End: 2020-05-12
Payer: MEDICARE

## 2020-05-12 PROCEDURE — 99442: CPT | Mod: CR

## 2020-05-12 NOTE — ASSESSMENT
[FreeTextEntry1] : Mr. Pace is doing well form a pulmonary perspective. He has a history of COPD, AIDS, allergy, marijuana use, abnormal CT. He is stable from a pulmonary perspective. He was spoken to via telephone call today for pulmonary follow up.  His number one issue [facility] (in [place])\par \par problem 1: lung cancer screening (new nodule 7 mm 11/16/18)\par -residual abnormality on CT c/w inflammatory disease\par -follow up chest CT in 2/2020\par \par problem 2: COPD \par -prior bronchitis resolved\par -on the shelf is Stiolto and QVar\par \par -Inhaler technique reviewed as well as oral hygiene techniques reviewed with patient. Avoidance of cold air, extremes of temperature, rescue inhaler should be used before exercise. Order of medication reviewed with patient. Recommended use of a cool mist humidifier in the bedroom. \par \par problem 3: allergy sinus \par -continue Clarinex 5 mg before bed\par -Add Singulair 10 mg before bed \par -Add Omnaris 1 sniff BID\par -recommended to use OTC eye drops and nasal saline\par -Environmental measures for allergies were encouraged including mattress and pillow cover, air purifier, and environmental controls.\par \par \par problem 4: marijuana use\par -marijuana cessation encouraged (approximately 4 minutes) handout provided\par -recommended to get evaluated by Dr. Brenda Vera\par \par problem 5: snoring\par -recommended to use nasal saline\par -recommended positional sleep \par \par problem 6: GERD\par -diet controlled \par -Rule of 2s: avoid eating too much, eating too late, eating too spicy, eating two hours before bed\par -Things to avoid including overeating, spicy foods, tight clothing, eating within three hours of bed, this list is not all inclusive. \par -For treatment of reflux, possible options discussed including diet control, H2 blockers, PPIs, as well as coating motility agents discussed as treatment options. Timing of meals and proximity of last meal to sleep were discussed. If symptoms persist, a formal gastrointestinal evaluation is needed.\par \par problem 7: overweight (resolved)\par -Weight loss, exercise, and diet control were discussed and are highly encouraged. Treatment options were given such as, aqua therapy, and contacting a nutritionist. Recommended to use the elliptical, stationary bike, less use of treadmill.  Obesity is associated with worsening asthma, shortness of breath, and potential for cardiac disease, diabetes, and other underlying medical conditions. \par \par Problem 8: Health Maintenance/COVID19 Precautions:\par - Clean your hands often. Wash your hands often with soap and water for at least 20 seconds, especially after blowing your nose, coughing, or sneezing, or having been in a public place.\par - If soap and water are not available, use a hand  that contains at least 60% alcohol.\par - To the extent possible, avoid touching high-touch surfaces in public places - elevator buttons, door handles, handrails, handshaking with people, etc. Use a tissue or your sleeve to cover your hand or finger if you must touch something.\par - Wash your hands after touching surfaces in public places.\par - Avoid touching your face, nose, eyes, etc.\par - Clean and disinfect your home to remove germs: practice routine cleaning of frequently touched surfaces (for example: tables, doorknobs, light switches, handles, desks, toilets, faucets, sinks & cell phones)\par - Avoid crowds, especially in poorly ventilated spaces. Your risk of exposure to respiratory viruses like COVID-19 may increase in crowded, closed-in settings with little air circulation if there are people in the crowd who are sick. All patients are recommended to practice social distancing and stay at least 6 feet away from others.\par - Avoid all non-essential travel including plane trips, and especially avoid embarking on cruise ships.\par -If COVID-19 is spreading in your community, take extra measures to put distance between yourself and other people to further reduce your risk of being exposed to this new virus.\par -Stay home as much as possible.\par - Consider ways of getting food brought to your house through family, social, or commercial networks\par -Be aware that the virus has been known to live in the air up to 3 hours post exposure, cardboard up to 24 hours post exposure, copper up to 4 hours post exposure, steel and plastic up to 2-3 days post exposure. Risk of transmission from these surfaces are affected by many variables.\par Immune Support Recommendations:\par -OTC Vitamin C 500mg BID \par -OTC Quercetin 250-500mg BID \par -OTC Zinc 75-100mg per day \par -OTC Melatonin 1 or 2 mg a night \par -OTC Vitamin D 1-4000mg per day \par -OTC Tonic Water 8oz per day\par Asthma and COVID19:\par You need to make sure your asthma is under control. This often requires the use of inhaled corticosteroids (and sometimes oral corticosteroids). Inhaled corticosteroids do not likely reduce your immune system’s ability to fight infections, but oral corticosteroids may. It is important to use the steps above to protect yourself to limit your exposure to any respiratory virus.\par \par problem 9: health maintenance \par -s/p influenza vaccine - 2019\par -recommended strep pneumonia vaccines: Prevnar-13 vaccine, followed by Pneumo vaccine 23 one year following\par -recommended early intervention for URIs\par -recommended regular osteoporosis evaluations\par -recommended early dermatological evaluations\par -recommended after the age of 50 to the age of 70, colonoscopy every 5 years \par \par F/U in 6 months with SPI and DLCO\par He is encouraged to call with any changes, concerns, or questions.

## 2020-05-12 NOTE — ADDENDUM
[FreeTextEntry1] : Documented by Roseanna Reddy acting as a scribe for Dr. Adrien Mack on 05/12/2020.\par \par All medical record entries made by the Scribe were at my, Dr. Adrien Mack's, direction and personally dictated by me on 05/12/2020. I have reviewed the chart and agree that the record accurately reflects my personal performance of the history, physical exam, assessment and plan. I have also personally directed, reviewed, and agree with the discharge instructions.

## 2020-05-12 NOTE — HISTORY OF PRESENT ILLNESS
[FreeTextEntry1] : Mr. Pace is a 75 year old male with a history of abnormal chest CT, AIDS, BPH, COPD, diverticulitis, GERD, hyperlipidemia, snoring, SOB, DM, who was spoken to via telephone call for a follow up visit. His chief complaint is \par - he notes he's in [place], he's taking something for his chest pain. \par - he denies any itchy eyes / ears \par - he denies any ankle swelling / leg swelling \par - he notes he has been quarantining \par - he denies coughing / wheezing \par - he notes in general he feels okay \par - he notes his prostate biopsy was okay \par -he denies any headaches, nausea, vomiting, fever, chills, sweats, chest pain, chest pressure, diarrhea, constipation, dysphagia, dizziness, sour taste in the mouth, leg swelling, leg pain, itchy eyes, itchy ears, heartburn, reflux, myalgias or arthralgias.

## 2020-05-12 NOTE — REASON FOR VISIT
[Follow-Up] : a follow-up visit [FreeTextEntry1] : telephonic - abnormal chest CT, AIDS, COPD, GERD, snoring, SOB

## 2020-05-12 NOTE — END OF VISIT
[FreeTextEntry3] : I have spent 15 minutes of time on the phone with the patient with their consent.

## 2020-06-10 ENCOUNTER — APPOINTMENT (OUTPATIENT)
Dept: PULMONOLOGY | Facility: CLINIC | Age: 75
End: 2020-06-10

## 2020-06-10 NOTE — HISTORY OF PRESENT ILLNESS
[Home] : at home, [unfilled] , at the time of the visit. [Medical Office: ([facility])___] : at the medical office located in  [Verbal consent obtained from patient] : the patient, [unfilled] [FreeTextEntry1] : Mr. Pace is a 75 year old male with a history of abnormal chest CT, AIDS, BPH, COPD, diverticulitis, GERD, hyperlipidemia, snoring, SOB, DM, who was spoken to via telephone call for a follow up visit. His chief complaint is \par \par -today he denies any headaches, nausea, vomiting, fever, chills, sweats, chest pain, chest pressure, diarrhea, constipation, dysphagia, dizziness, leg swelling, leg pain, itchy eyes, itchy ears, heartburn, reflux, or sour taste in the mouth.

## 2020-07-19 ENCOUNTER — TRANSCRIPTION ENCOUNTER (OUTPATIENT)
Age: 75
End: 2020-07-19

## 2020-07-22 ENCOUNTER — APPOINTMENT (OUTPATIENT)
Dept: INFECTIOUS DISEASE | Facility: CLINIC | Age: 75
End: 2020-07-22
Payer: MEDICARE

## 2020-07-22 VITALS
OXYGEN SATURATION: 98 % | RESPIRATION RATE: 16 BRPM | DIASTOLIC BLOOD PRESSURE: 76 MMHG | TEMPERATURE: 97.6 F | BODY MASS INDEX: 23.78 KG/M2 | WEIGHT: 148 LBS | HEART RATE: 78 BPM | HEIGHT: 66 IN | SYSTOLIC BLOOD PRESSURE: 142 MMHG

## 2020-07-22 DIAGNOSIS — B25.9 CYTOMEGALOVIRAL DISEASE, UNSPECIFIED: ICD-10-CM

## 2020-07-22 PROCEDURE — 99214 OFFICE O/P EST MOD 30 MIN: CPT

## 2020-07-23 DIAGNOSIS — E87.5 HYPERKALEMIA: ICD-10-CM

## 2020-07-23 LAB
ALBUMIN SERPL ELPH-MCNC: 5.1 G/DL
ALP BLD-CCNC: 112 U/L
ALT SERPL-CCNC: 18 U/L
AMYLASE/CREAT SERPL: 131 U/L
ANION GAP SERPL CALC-SCNC: 16 MMOL/L
APPEARANCE: CLEAR
AST SERPL-CCNC: 22 U/L
BACTERIA: NEGATIVE
BASOPHILS # BLD AUTO: 0.08 K/UL
BASOPHILS NFR BLD AUTO: 1.1 %
BILIRUB SERPL-MCNC: 0.4 MG/DL
BILIRUBIN URINE: NEGATIVE
BLOOD URINE: NEGATIVE
BUN SERPL-MCNC: 19 MG/DL
CALCIUM SERPL-MCNC: 10.4 MG/DL
CD3 CELLS # BLD: 1253 /UL
CD3 CELLS NFR BLD: 70 %
CD3+CD4+ CELLS # BLD: 546 /UL
CD3+CD4+ CELLS NFR BLD: 31 %
CD3+CD4+ CELLS/CD3+CD8+ CLL SPEC: 0.85 RATIO
CD3+CD8+ CELLS # SPEC: 639 /UL
CD3+CD8+ CELLS NFR BLD: 36 %
CHLORIDE SERPL-SCNC: 98 MMOL/L
CO2 SERPL-SCNC: 23 MMOL/L
COLOR: YELLOW
CREAT SERPL-MCNC: 1.14 MG/DL
EOSINOPHIL # BLD AUTO: 0.12 K/UL
EOSINOPHIL NFR BLD AUTO: 1.7 %
GLUCOSE QUALITATIVE U: NEGATIVE
GLUCOSE SERPL-MCNC: 114 MG/DL
HCT VFR BLD CALC: 52.3 %
HGB BLD-MCNC: 15.8 G/DL
HYALINE CASTS: 2 /LPF
IMM GRANULOCYTES NFR BLD AUTO: 0.1 %
KETONES URINE: NEGATIVE
LEUKOCYTE ESTERASE URINE: NEGATIVE
LPL SERPL-CCNC: 100 U/L
LYMPHOCYTES # BLD AUTO: 1.89 K/UL
LYMPHOCYTES NFR BLD AUTO: 26.9 %
MAN DIFF?: NORMAL
MCHC RBC-ENTMCNC: 27.8 PG
MCHC RBC-ENTMCNC: 30.2 GM/DL
MCV RBC AUTO: 92.1 FL
MICROSCOPIC-UA: NORMAL
MONOCYTES # BLD AUTO: 0.65 K/UL
MONOCYTES NFR BLD AUTO: 9.2 %
NEUTROPHILS # BLD AUTO: 4.28 K/UL
NEUTROPHILS NFR BLD AUTO: 61 %
NITRITE URINE: NEGATIVE
PH URINE: 5.5
PLATELET # BLD AUTO: 352 K/UL
POTASSIUM SERPL-SCNC: 5.7 MMOL/L
PROT SERPL-MCNC: 8.9 G/DL
PROTEIN URINE: NORMAL
RBC # BLD: 5.68 M/UL
RBC # FLD: 17.2 %
RED BLOOD CELLS URINE: 1 /HPF
SODIUM SERPL-SCNC: 138 MMOL/L
SPECIFIC GRAVITY URINE: 1.02
SQUAMOUS EPITHELIAL CELLS: 0 /HPF
T PALLIDUM AB SER QL IA: NEGATIVE
UROBILINOGEN URINE: NORMAL
WBC # FLD AUTO: 7.03 K/UL
WHITE BLOOD CELLS URINE: 1 /HPF

## 2020-07-24 ENCOUNTER — EMERGENCY (EMERGENCY)
Facility: HOSPITAL | Age: 75
LOS: 1 days | Discharge: HOME | End: 2020-07-24
Attending: EMERGENCY MEDICINE
Payer: MEDICARE

## 2020-07-24 ENCOUNTER — APPOINTMENT (OUTPATIENT)
Dept: INFECTIOUS DISEASE | Facility: CLINIC | Age: 75
End: 2020-07-24

## 2020-07-24 VITALS
OXYGEN SATURATION: 100 % | TEMPERATURE: 98 F | DIASTOLIC BLOOD PRESSURE: 81 MMHG | SYSTOLIC BLOOD PRESSURE: 128 MMHG | HEART RATE: 58 BPM | RESPIRATION RATE: 16 BRPM

## 2020-07-24 VITALS
SYSTOLIC BLOOD PRESSURE: 153 MMHG | RESPIRATION RATE: 16 BRPM | DIASTOLIC BLOOD PRESSURE: 93 MMHG | HEART RATE: 71 BPM | TEMPERATURE: 98 F | OXYGEN SATURATION: 100 % | WEIGHT: 149.91 LBS

## 2020-07-24 DIAGNOSIS — Z90.49 ACQUIRED ABSENCE OF OTHER SPECIFIED PARTS OF DIGESTIVE TRACT: Chronic | ICD-10-CM

## 2020-07-24 LAB
ALBUMIN SERPL ELPH-MCNC: 4.7 G/DL — SIGNIFICANT CHANGE UP (ref 3.3–5)
ALP SERPL-CCNC: 105 U/L — SIGNIFICANT CHANGE UP (ref 40–120)
ALT FLD-CCNC: 21 U/L — SIGNIFICANT CHANGE UP (ref 10–45)
ANION GAP SERPL CALC-SCNC: 12 MMOL/L — SIGNIFICANT CHANGE UP (ref 5–17)
AST SERPL-CCNC: 22 U/L — SIGNIFICANT CHANGE UP (ref 10–40)
BASOPHILS # BLD AUTO: 0.09 K/UL — SIGNIFICANT CHANGE UP (ref 0–0.2)
BASOPHILS NFR BLD AUTO: 1.3 % — SIGNIFICANT CHANGE UP (ref 0–2)
BILIRUB SERPL-MCNC: 0.3 MG/DL — SIGNIFICANT CHANGE UP (ref 0.2–1.2)
BUN SERPL-MCNC: 21 MG/DL — SIGNIFICANT CHANGE UP (ref 7–23)
CALCIUM SERPL-MCNC: 9.6 MG/DL — SIGNIFICANT CHANGE UP (ref 8.4–10.5)
CHLORIDE SERPL-SCNC: 98 MMOL/L — SIGNIFICANT CHANGE UP (ref 96–108)
CO2 SERPL-SCNC: 24 MMOL/L — SIGNIFICANT CHANGE UP (ref 22–31)
CREAT SERPL-MCNC: 1.14 MG/DL — SIGNIFICANT CHANGE UP (ref 0.5–1.3)
EOSINOPHIL # BLD AUTO: 0.12 K/UL — SIGNIFICANT CHANGE UP (ref 0–0.5)
EOSINOPHIL NFR BLD AUTO: 1.7 % — SIGNIFICANT CHANGE UP (ref 0–6)
GAS PNL BLDV: SIGNIFICANT CHANGE UP
GLUCOSE SERPL-MCNC: 98 MG/DL — SIGNIFICANT CHANGE UP (ref 70–99)
HCT VFR BLD CALC: 48 % — SIGNIFICANT CHANGE UP (ref 39–50)
HGB BLD-MCNC: 14.9 G/DL — SIGNIFICANT CHANGE UP (ref 13–17)
IMM GRANULOCYTES NFR BLD AUTO: 0.3 % — SIGNIFICANT CHANGE UP (ref 0–1.5)
LYMPHOCYTES # BLD AUTO: 2.17 K/UL — SIGNIFICANT CHANGE UP (ref 1–3.3)
LYMPHOCYTES # BLD AUTO: 30.3 % — SIGNIFICANT CHANGE UP (ref 13–44)
MCHC RBC-ENTMCNC: 27.5 PG — SIGNIFICANT CHANGE UP (ref 27–34)
MCHC RBC-ENTMCNC: 31 GM/DL — LOW (ref 32–36)
MCV RBC AUTO: 88.7 FL — SIGNIFICANT CHANGE UP (ref 80–100)
MONOCYTES # BLD AUTO: 0.56 K/UL — SIGNIFICANT CHANGE UP (ref 0–0.9)
MONOCYTES NFR BLD AUTO: 7.8 % — SIGNIFICANT CHANGE UP (ref 2–14)
NEUTROPHILS # BLD AUTO: 4.2 K/UL — SIGNIFICANT CHANGE UP (ref 1.8–7.4)
NEUTROPHILS NFR BLD AUTO: 58.6 % — SIGNIFICANT CHANGE UP (ref 43–77)
NRBC # BLD: 0 /100 WBCS — SIGNIFICANT CHANGE UP (ref 0–0)
PLATELET # BLD AUTO: 323 K/UL — SIGNIFICANT CHANGE UP (ref 150–400)
POTASSIUM SERPL-MCNC: 4 MMOL/L — SIGNIFICANT CHANGE UP (ref 3.5–5.3)
POTASSIUM SERPL-SCNC: 4 MMOL/L — SIGNIFICANT CHANGE UP (ref 3.5–5.3)
PROT SERPL-MCNC: 8.8 G/DL — HIGH (ref 6–8.3)
RBC # BLD: 5.41 M/UL — SIGNIFICANT CHANGE UP (ref 4.2–5.8)
RBC # FLD: 16.7 % — HIGH (ref 10.3–14.5)
SODIUM SERPL-SCNC: 134 MMOL/L — LOW (ref 135–145)
WBC # BLD: 7.16 K/UL — SIGNIFICANT CHANGE UP (ref 3.8–10.5)
WBC # FLD AUTO: 7.16 K/UL — SIGNIFICANT CHANGE UP (ref 3.8–10.5)

## 2020-07-24 PROCEDURE — 99284 EMERGENCY DEPT VISIT MOD MDM: CPT

## 2020-07-24 PROCEDURE — 82330 ASSAY OF CALCIUM: CPT

## 2020-07-24 PROCEDURE — 82803 BLOOD GASES ANY COMBINATION: CPT

## 2020-07-24 PROCEDURE — 85014 HEMATOCRIT: CPT

## 2020-07-24 PROCEDURE — 99283 EMERGENCY DEPT VISIT LOW MDM: CPT

## 2020-07-24 PROCEDURE — 84295 ASSAY OF SERUM SODIUM: CPT

## 2020-07-24 PROCEDURE — 93010 ELECTROCARDIOGRAM REPORT: CPT

## 2020-07-24 PROCEDURE — 85027 COMPLETE CBC AUTOMATED: CPT

## 2020-07-24 PROCEDURE — 84132 ASSAY OF SERUM POTASSIUM: CPT

## 2020-07-24 PROCEDURE — 80053 COMPREHEN METABOLIC PANEL: CPT

## 2020-07-24 PROCEDURE — 83605 ASSAY OF LACTIC ACID: CPT

## 2020-07-24 PROCEDURE — 82947 ASSAY GLUCOSE BLOOD QUANT: CPT

## 2020-07-24 PROCEDURE — 82435 ASSAY OF BLOOD CHLORIDE: CPT

## 2020-07-24 PROCEDURE — 93005 ELECTROCARDIOGRAM TRACING: CPT

## 2020-07-24 RX ORDER — SERTRALINE 25 MG/1
0.5 TABLET, FILM COATED ORAL
Qty: 0 | Refills: 0 | DISCHARGE

## 2020-07-24 RX ORDER — ATORVASTATIN CALCIUM 80 MG/1
1 TABLET, FILM COATED ORAL
Qty: 0 | Refills: 0 | DISCHARGE

## 2020-07-24 NOTE — ED PROVIDER NOTE - PATIENT PORTAL LINK FT
You can access the FollowMyHealth Patient Portal offered by [facility] by registering at the following website: [email or web address]. By joining [facility]’s FollowMyHealth portal, you will also be able to view your health information using other applications (apps) compatible with our system.

## 2020-07-24 NOTE — ED ADULT NURSE NOTE - OBJECTIVE STATEMENT
69 y/o male sent to ER by MD for elevated potassium level of 5.9.   Pt with history of HIV, follows with ID clinic.   Patient states that 3 days ago, he had his routine blood work done at ID clinic, he received a call that his potassium was 5.7.   Pt was told it needed to be repeated so he went back today for repeat labs and was informed his potassium was 5.9.   Pt reports that over the past few weeks he has been eating more red meat.  Patient denies  fever, chills, cough, SOB, chest pain, palpitations.  No acute respiratory distress noted.  Respiration easy and unlabored.

## 2020-07-24 NOTE — ED PROVIDER NOTE - NSFOLLOWUPINSTRUCTIONS_ED_ALL_ED_FT
Potassium levels are normal.  May continue to follow up with your ID doctor.  Continue to take all medications as previously prescribed.

## 2020-07-24 NOTE — ED PROVIDER NOTE - OBJECTIVE STATEMENT
70yM pmhx HIV ( Undetected viral load, follows with ID clinic Dr. White) HTN, HLA, reflux esophagitis, psoriasis, arthritis, monoclonal gammopathy sent to ER for elevated potassium level of 5.9.  Patient went for routine blood work at ID clinic 3days ago and received a call that his K=5.7, told it needed to be repeating went back today for repeat labs and k=5.9.  Admits has recently been eating more red meat over past few weeks. Patient denies hx of hyperkalemia, changes in medications, recent work out, denies CP, SOB,  palpitations.

## 2020-07-24 NOTE — ED ADULT NURSE REASSESSMENT NOTE - NS ED NURSE REASSESS COMMENT FT1
Report received from Rozina RN. Pt A&OX3 calm and cooperative, awaiting disposition. Denies dizziness at the moment, palpitations, chest pain, sob, ha, n/v/d, abdominal pain, f/c, urinary symptoms, hematuria.

## 2020-07-24 NOTE — ED ADULT NURSE NOTE - NSIMPLEMENTINTERV_GEN_ALL_ED
Implemented All Universal Safety Interventions:  [place] to call system. Call bell, personal items and telephone within reach. Instruct patient to call for assistance. Room bathroom lighting operational. Non-slip footwear when patient is off stretcher. Physically safe environment: no spills, clutter or unnecessary equipment. Stretcher in lowest position, wheels locked, appropriate side rails in place.

## 2020-07-24 NOTE — ED PROVIDER NOTE - CLINICAL SUMMARY MEDICAL DECISION MAKING FREE TEXT BOX
70yM  HIV ( undetected viral load) HTN, HLA, reflux esophagitis, psoriasis, arthritis, monoclonal gammopathy sent to ER for elevated potassium of 5.9.    -EKG, repeat labs and reassess 70yM  HIV ( undetected viral load) HTN, HLA, reflux esophagitis, psoriasis, arthritis, monoclonal gammopathy sent to ER for elevated potassium of 5.9.  ECG is sinus tere ,no peak T ,looks good ,will repeated blood work and on reevaluation: ZR

## 2020-07-27 LAB
ANION GAP SERPL CALC-SCNC: 14 MMOL/L
BUN SERPL-MCNC: 21 MG/DL
CALCIUM SERPL-MCNC: 10.4 MG/DL
CHLORIDE SERPL-SCNC: 99 MMOL/L
CMV DNA SPEC QL NAA+PROBE: NOT DETECTED IU/ML
CO2 SERPL-SCNC: 26 MMOL/L
CREAT SERPL-MCNC: 1.18 MG/DL
GLUCOSE SERPL-MCNC: 130 MG/DL
MAGNESIUM SERPL-MCNC: 2.3 MG/DL
POTASSIUM SERPL-SCNC: 5.9 MMOL/L
SODIUM SERPL-SCNC: 139 MMOL/L

## 2020-07-29 ENCOUNTER — NON-APPOINTMENT (OUTPATIENT)
Age: 75
End: 2020-07-29

## 2020-07-29 ENCOUNTER — APPOINTMENT (OUTPATIENT)
Dept: OPHTHALMOLOGY | Facility: CLINIC | Age: 75
End: 2020-07-29
Payer: MEDICARE

## 2020-07-29 PROCEDURE — 92285 EXTERNAL OCULAR PHOTOGRAPHY: CPT

## 2020-07-29 PROCEDURE — 92025 CPTRIZED CORNEAL TOPOGRAPHY: CPT

## 2020-07-29 PROCEDURE — 92004 COMPRE OPH EXAM NEW PT 1/>: CPT

## 2020-08-04 ENCOUNTER — FORM ENCOUNTER (OUTPATIENT)
Age: 75
End: 2020-08-04

## 2020-08-05 ENCOUNTER — APPOINTMENT (OUTPATIENT)
Dept: INFECTIOUS DISEASE | Facility: CLINIC | Age: 75
End: 2020-08-05
Payer: MEDICARE

## 2020-08-05 ENCOUNTER — MED ADMIN CHARGE (OUTPATIENT)
Age: 75
End: 2020-08-05

## 2020-08-05 VITALS
HEIGHT: 66 IN | TEMPERATURE: 97.4 F | BODY MASS INDEX: 24.27 KG/M2 | HEART RATE: 76 BPM | SYSTOLIC BLOOD PRESSURE: 135 MMHG | WEIGHT: 151 LBS | RESPIRATION RATE: 16 BRPM | DIASTOLIC BLOOD PRESSURE: 76 MMHG | OXYGEN SATURATION: 95 %

## 2020-08-05 PROCEDURE — 99214 OFFICE O/P EST MOD 30 MIN: CPT

## 2020-08-05 RX ORDER — ZOSTER VACCINE RECOMBINANT, ADJUVANTED 50 MCG/0.5
50 KIT INTRAMUSCULAR
Qty: 1 | Refills: 0 | Status: ACTIVE | COMMUNITY
Start: 2020-08-05

## 2020-08-18 ENCOUNTER — LABORATORY RESULT (OUTPATIENT)
Age: 75
End: 2020-08-18

## 2020-08-18 ENCOUNTER — APPOINTMENT (OUTPATIENT)
Dept: INFECTIOUS DISEASE | Facility: CLINIC | Age: 75
End: 2020-08-18

## 2020-08-19 ENCOUNTER — APPOINTMENT (OUTPATIENT)
Dept: PULMONOLOGY | Facility: CLINIC | Age: 75
End: 2020-08-19
Payer: MEDICARE

## 2020-08-19 VITALS
RESPIRATION RATE: 17 BRPM | HEIGHT: 65 IN | SYSTOLIC BLOOD PRESSURE: 130 MMHG | DIASTOLIC BLOOD PRESSURE: 70 MMHG | WEIGHT: 150 LBS | HEART RATE: 71 BPM | OXYGEN SATURATION: 97 % | BODY MASS INDEX: 24.99 KG/M2

## 2020-08-19 DIAGNOSIS — R06.83 SNORING: ICD-10-CM

## 2020-08-19 DIAGNOSIS — Z01.812 ENCOUNTER FOR PREPROCEDURAL LABORATORY EXAMINATION: ICD-10-CM

## 2020-08-19 LAB
HIV1 RNA # SERPL NAA+PROBE: 23 COPIES/ML
VIRAL LOAD LOG: 1.36

## 2020-08-19 PROCEDURE — 99214 OFFICE O/P EST MOD 30 MIN: CPT | Mod: 25

## 2020-08-19 PROCEDURE — 94618 PULMONARY STRESS TESTING: CPT

## 2020-08-19 NOTE — ADDENDUM
[FreeTextEntry1] : Documented by Roseanna Reddy acting as a scribe for Dr. Adrien Mack on 08/19/2020.\par \par All medical record entries made by the Scribe were at my, Dr. Adrien Mack's, direction and personally dictated by me on 08/19/2020. I have reviewed the chart and agree that the record accurately reflects my personal performance of the history, physical exam, assessment and plan. I have also personally directed, reviewed, and agree with the discharge instructions.

## 2020-08-19 NOTE — ASSESSMENT
[FreeTextEntry1] : Mr. Pace is 75 y.o M who is doing well form a pulmonary perspective. He has a history of COPD, AIDS, allergy, marijuana use, abnormal CT. He is stable from a pulmonary perspective. He presents into the office today for pulmonary follow up.  His number one issue [facility] (in [place])- c/w CMV. \par \par problem 1: lung cancer screening (new nodule 7 mm 11/16/18)\par -residual abnormality on CT c/w inflammatory disease\par -follow up chest CT in 2/2020\par \par problem 2: COPD - stable \par -prior bronchitis resolved\par -on the shelf is Stiolto and QVar\par \par -Inhaler technique reviewed as well as oral hygiene techniques reviewed with patient. Avoidance of cold air, extremes of temperature, rescue inhaler should be used before exercise. Order of medication reviewed with patient. Recommended use of a cool mist humidifier in the bedroom. \par \par problem 3: allergy sinus \par -continue Clarinex 5 mg before bed\par -continue Singulair 10 mg before bed \par -continue Omnaris 1 sniff BID\par -recommended to use OTC eye drops and nasal saline\par -Environmental measures for allergies were encouraged including mattress and pillow cover, air purifier, and environmental controls.\par \par \par problem 4: marijuana use\par -marijuana cessation encouraged (approximately 4 minutes) handout provided\par -recommended to get evaluated by Dr. Brenda Vera\par \par problem 5: snoring\par -recommended to use nasal saline\par -recommended positional sleep \par \par problem 6: GERD\par -diet controlled \par -Rule of 2s: avoid eating too much, eating too late, eating too spicy, eating two hours before bed\par -Things to avoid including overeating, spicy foods, tight clothing, eating within three hours of bed, this list is not all inclusive. \par -For treatment of reflux, possible options discussed including diet control, H2 blockers, PPIs, as well as coating motility agents discussed as treatment options. Timing of meals and proximity of last meal to sleep were discussed. If symptoms persist, a formal gastrointestinal evaluation is needed.\par \par problem 7: overweight (resolved)\par -Weight loss, exercise, and diet control were discussed and are highly encouraged. Treatment options were given such as, aqua therapy, and contacting a nutritionist. Recommended to use the elliptical, stationary bike, less use of treadmill.  Obesity is associated with worsening asthma, shortness of breath, and potential for cardiac disease, diabetes, and other underlying medical conditions. \par \par Problem 8: Health Maintenance/COVID19 Precautions:\par - Clean your hands often. Wash your hands often with soap and water for at least 20 seconds, especially after blowing your nose, coughing, or sneezing, or having been in a public place.\par - If soap and water are not available, use a hand  that contains at least 60% alcohol.\par - To the extent possible, avoid touching high-touch surfaces in public places - elevator buttons, door handles, handrails, handshaking with people, etc. Use a tissue or your sleeve to cover your hand or finger if you must touch something.\par - Wash your hands after touching surfaces in public places.\par - Avoid touching your face, nose, eyes, etc.\par - Clean and disinfect your home to remove germs: practice routine cleaning of frequently touched surfaces (for example: tables, doorknobs, light switches, handles, desks, toilets, faucets, sinks & cell phones)\par - Avoid crowds, especially in poorly ventilated spaces. Your risk of exposure to respiratory viruses like COVID-19 may increase in crowded, closed-in settings with little air circulation if there are people in the crowd who are sick. All patients are recommended to practice social distancing and stay at least 6 feet away from others.\par - Avoid all non-essential travel including plane trips, and especially avoid embarking on cruise ships.\par -If COVID-19 is spreading in your community, take extra measures to put distance between yourself and other people to further reduce your risk of being exposed to this new virus.\par -Stay home as much as possible.\par - Consider ways of getting food brought to your house through family, social, or commercial networks\par -Be aware that the virus has been known to live in the air up to 3 hours post exposure, cardboard up to 24 hours post exposure, copper up to 4 hours post exposure, steel and plastic up to 2-3 days post exposure. Risk of transmission from these surfaces are affected by many variables.\par Immune Support Recommendations:\par -OTC Vitamin C 500mg BID \par -OTC Quercetin 250-500mg BID \par -OTC Zinc 75-100mg per day \par -OTC Melatonin 1 or 2 mg a night \par -OTC Vitamin D 1-4000mg per day \par -OTC Tonic Water 8oz per day\par Asthma and COVID19:\par You need to make sure your asthma is under control. This often requires the use of inhaled corticosteroids (and sometimes oral corticosteroids). Inhaled corticosteroids do not likely reduce your immune system’s ability to fight infections, but oral corticosteroids may. It is important to use the steps above to protect yourself to limit your exposure to any respiratory virus.\par \par problem 9: health maintenance \par -s/p influenza vaccine - 2019\par -recommended strep pneumonia vaccines: Prevnar-13 vaccine, followed by Pneumo vaccine 23 one year following\par -recommended early intervention for URIs\par -recommended regular osteoporosis evaluations\par -recommended early dermatological evaluations\par -recommended after the age of 50 to the age of 70, colonoscopy every 5 years \par \par F/U in 6 months with SPI and DLCO\par He is encouraged to call with any changes, concerns, or questions.

## 2020-08-19 NOTE — PROCEDURE
[FreeTextEntry1] : 6 minute walk test reveals a low saturation of 96 % with no evidence of dyspnea or fatigue; walked 586.8  meters \par

## 2020-08-19 NOTE — HISTORY OF PRESENT ILLNESS
[FreeTextEntry1] : Mr. Pace is a 75 year old male with a history of abnormal chest CT, AIDS, BPH, COPD, diverticulitis, GERD, hyperlipidemia, snoring, SOB, DM, who presents into the office for a follow up visit. His chief complaint is \par - right now he's feeling generally okay \par - he had some diarrhea \par - he had CMV, everything was okay now. He was sent for an MRI. \par - he had some issue with his ear\par - he has not been exercising, he has been walking a little bit \par - he does not feel any SOB \par - no coughing \par - he notes the bottom part of his leg feels weak \par - no hoarseness \par - his sinuses have been good\par - he has an issue in the rectum, its bleeding again and he feels its due to the virus he had. He will be going to the doctor for follow up. \par - he has been feeling much better but he still has some residual symptoms. \par - he has been sleeping well, he sleeps at least 8 hours a night \par -he denies any headaches, nausea, vomiting, fever, chills, sweats, chest pain, chest pressure, diarrhea, constipation, dysphagia, dizziness, sour taste in the mouth, leg swelling, leg pain, itchy eyes, itchy ears, heartburn, reflux, myalgias or arthralgias.

## 2020-08-19 NOTE — PHYSICAL EXAM
[No Acute Distress] : no acute distress [Normal Oropharynx] : normal oropharynx [No Neck Mass] : no neck mass [Normal Appearance] : normal appearance [Normal S1, S2] : normal s1, s2 [Normal Rate/Rhythm] : normal rate/rhythm [No Resp Distress] : no resp distress [Clear to Auscultation Bilaterally] : clear to auscultation bilaterally [No Murmurs] : no murmurs [Benign] : benign [No Abnormalities] : no abnormalities [Normal Gait] : normal gait [No Clubbing] : no clubbing [No Cyanosis] : no cyanosis [No Edema] : no edema [FROM] : FROM [No Focal Deficits] : no focal deficits [Oriented x3] : oriented x3 [Normal Color/ Pigmentation] : normal color/ pigmentation [Normal Affect] : normal affect [II] : Mallampati Class: II [TextBox_68] : I:E ratio 1:3; clear

## 2020-08-24 ENCOUNTER — OUTPATIENT (OUTPATIENT)
Dept: OUTPATIENT SERVICES | Facility: HOSPITAL | Age: 75
LOS: 1 days | End: 2020-08-24
Payer: MEDICARE

## 2020-08-24 ENCOUNTER — APPOINTMENT (OUTPATIENT)
Dept: CT IMAGING | Facility: CLINIC | Age: 75
End: 2020-08-24
Payer: MEDICARE

## 2020-08-24 ENCOUNTER — RESULT REVIEW (OUTPATIENT)
Age: 75
End: 2020-08-24

## 2020-08-24 DIAGNOSIS — Z90.49 ACQUIRED ABSENCE OF OTHER SPECIFIED PARTS OF DIGESTIVE TRACT: Chronic | ICD-10-CM

## 2020-08-24 DIAGNOSIS — R06.02 SHORTNESS OF BREATH: ICD-10-CM

## 2020-08-24 DIAGNOSIS — R93.89 ABNORMAL FINDINGS ON DIAGNOSTIC IMAGING OF OTHER SPECIFIED BODY STRUCTURES: ICD-10-CM

## 2020-08-24 PROCEDURE — 71250 CT THORAX DX C-: CPT | Mod: 26

## 2020-08-24 PROCEDURE — 71250 CT THORAX DX C-: CPT

## 2020-08-27 ENCOUNTER — LABORATORY RESULT (OUTPATIENT)
Age: 75
End: 2020-08-27

## 2020-08-27 ENCOUNTER — APPOINTMENT (OUTPATIENT)
Dept: SURGERY | Facility: CLINIC | Age: 75
End: 2020-08-27
Payer: MEDICARE

## 2020-08-27 VITALS
OXYGEN SATURATION: 97 % | DIASTOLIC BLOOD PRESSURE: 82 MMHG | TEMPERATURE: 98 F | SYSTOLIC BLOOD PRESSURE: 155 MMHG | RESPIRATION RATE: 15 BRPM | HEART RATE: 76 BPM

## 2020-08-27 DIAGNOSIS — K62.5 HEMORRHAGE OF ANUS AND RECTUM: ICD-10-CM

## 2020-08-27 PROCEDURE — 46606 ANOSCOPY AND BIOPSY: CPT

## 2020-08-27 PROCEDURE — 99213 OFFICE O/P EST LOW 20 MIN: CPT | Mod: 25

## 2020-08-27 NOTE — PHYSICAL EXAM
[FreeTextEntry1] : Perianal inspection unremarkable.  Digital exam demonstrated ulcerated firm tissue circumferential at the level of the anal transition zone.  Anoscopy demonstrated ulcerated irregular mucosa with active bleeding.  Sloughed soft tissue was noted and sent for pathology.

## 2020-08-27 NOTE — ASSESSMENT
[FreeTextEntry1] : Origin of patient's current rectal ulcerations/proctitis is unclear.  It is likely either infectious or neoplastic.  There was sloughed tissue from anoscopy today and this will be sent for pathology.  I recommended that the patient follow-up with infectious diseases for all the appropriate STD cultures.  If the diagnosis is not made on pathology from today or STD cultures by infectious diseases then an exam under anesthesia with further biopsies will be needed.

## 2020-08-27 NOTE — HISTORY OF PRESENT ILLNESS
[FreeTextEntry1] : Bertin is a 74 y/o male, hx of AIDS. Here for evaluation of rectal bleeding. Patient is s/p posterior intersphincteric fistulotomy and hemorrhoidectomy on 5/9/17. Last seen on 6/27/19, patient with squamous metaplasia in the anterior rectum. It was recommended patient follow-up with GI for a repeat sigmoidoscopy and biopsies.  Patient has had proctitis from STDs treated by infectious diseases.  Patient was recently treated for cytomegalovirus. Reports 2 to 3 BM daily. Has mucous per rectum. Has intermittent rectal bleeding with BM.

## 2020-08-31 ENCOUNTER — LABORATORY RESULT (OUTPATIENT)
Age: 75
End: 2020-08-31

## 2020-08-31 ENCOUNTER — APPOINTMENT (OUTPATIENT)
Dept: INFECTIOUS DISEASE | Facility: CLINIC | Age: 75
End: 2020-08-31

## 2020-08-31 NOTE — HISTORY OF PRESENT ILLNESS
[FreeTextEntry1] : Discussion/Summary\par Ademdum on Aug ----please send all STD test results to [facility] fax [phone]\par 2020----75 yoM with HIV/AIDS (dx 2016, CD4 leslie 130), HTN, HLA, COPD, GERD, DM, daily marijuana use, here for f/u. Virally suppressed on Biktarvy, no missed doses. No reported side effects. Pt went 2019 for bladder biopsy to rule out cancer and was negative.\par Just seen by otho to rule out CMV and all was negative as per pt. Recent chest X -Ray was normal and will fax results to his PCP , GI and pulmonary. \par  PCP is in at [address] Dr. Julia Novoa [phone]. FAX- [phone]\par Dr Max [phone] is GI and is going this week  [phone]\par Pulmonary Dr. Adrien Mack phone [phone] FAX- [phone]. \par In 2020 pt was seen for rectal pain and bleeding in [place] and a sigmoidoscopy was completed on 20 at [facility] by surgeon Cabrera White and CMV was noted on the results of the rectal biopsy and started with valganciclovir 450mg tablets for 42 days, \par plan: \par fax Chest X ray  results to his PCP , GI and pulmonary. \par  PCP is in at [address] [place], Dr[address] Julia . FAX- [phone]\par Dr Max  is GI  [phone]\par Pulmonary Dr. Adrien Mack  FAX- [phone].\par See Dr Boston for medical Marijuana\par 2nd Shingrix today\par lab today \par see in person in 4 months. \par \par see in 2 weeks. \par labs today. \par \par \par  \par Started taking spirulina tab daily. \par Followed by pulmonary Dr. Mack, repeat chest CT \par Has colonoscopy scheduled for 19\par Dental appt pending \par Completed a second HepB series with PCP last year. \par Smokes marijuana daily. Former cigarette smoker quit 40 years ago. Drinks a few times per week\par Lives in [place] Nov-May. Wife stays in [place] most of the time.\par \par Pt sometimes has male partners while in [place], meets at club. Occasionally uses condoms. Oral/anal verse \par \par Sexual History: Not sexually active with wife. Casual male partners The patient is sexually active and is not monogamous. The patient is using condoms for some encounters. \par Occupation: retired. \par Lives with wife HIV neg aware of pt status. \par  \par Active Problems\par Abnormal chest CT (793.2) (R93.89)\par Adjustment disorder, unspecified type (309.9) (F43.20)\par AIDS (042) (B20)\par Anal fistula (565.1) (K60.3)\par Anal skin tag (455.9) (K64.4)\par BPH (benign prostatic hypertrophy) (600.00) (N40.0)\par Chronic obstructive pulmonary disease, unspecified COPD type (496) (J44.9)\par Diverticulitis of colon (562.11) (K57.32)\par Erectile dysfunction (607.84) (N52.9)\par Gastroesophageal reflux disease with esophagitis (530.11) (K21.0)\par Hemorrhoid (455.6) (K64.9)\par HIV disease (042) (B20)\par Hyperlipidemia (272.4) (E78.5)\par IgA monoclonal gammopathy (273.1) (D47.2)\par Marijuana smoker (305.20) (F12.90)\par Muscle cramp, nocturnal (729.82) (R25.2)\par Need for hepatitis B vaccination (V05.3) (Z23)\par Rectal bleeding (569.3) (K62.5)\par Snoring (786.09) (R06.83)\par SOB (shortness of breath) (786.05) (R06.02)\par Type 2 diabetes mellitus (250.00) (E11.9)\par \par Past Medical History\par History of Cough (786.2) (R05)\par History of CD4 count (V15.89) (Z92.89)\par History of esophageal reflux (V12.79) (Z87.19)\par History of herpes zoster (V12.09) (Z86.19)\par History of hypertension (V12.59) (Z86.79)\par History of shortness of breath (V13.89) (Z87.898)\par History of Internal hemorrhoids (455.0) (K64.8)\par History of Loss of appetite (783.0) (R63.0)\par History of Pneumonia of right lower lobe due to infectious organism (486) (J18.1)\par History of Thrombosed hemorrhoids (455.7) (K64.5)\par History of Visit for dental examination (V72.2) (Z01.20)\par History of Visit for eye and vision exam (V72.0) (Z01.00)\par History of Weight loss, unintentional (783.21) (R63.4)\par \par \par \par \par \par \par \par \par \par \par \par History of Visit for eye and vision exam (V72.0) (Z01.00)\par  \par \par \par History of Visit for eye and vision exam (V72.0) (Z01.00)\par  \par \par Surgical History\par History of Anal Fistulectomy\par History of Appendectomy\par History of Hemorrhoidectomy\par History of Hemorrhoidectomy By Rubber Band Ligation\par \par Family History\par Family history of  : Mother, Father\par Family history of death of natural cause (V19.8) (Z84.89) : Mother\par Denied: Family history of malignant neoplasm\par Family history of Old age : Mother, Father\par \par Social History\par Former smoker (V15.82) (Z87.891)\par  · quit approx \par Born in [place]\par HIV Risk Factors: Bisexual contact\par Marijuana\par Marital History - Currently \par Moderate alcohol use\par Occupation\par Denied: History of Pets in the home\par \par Current Meds\par Aspirin 81 MG TABS\par Atorvastatin Calcium 20 MG Oral Tablet; take 1 tablet by mouth at bedtime\par Biktarvy -25 MG Oral Tablet; Take 1 tablet daily\par hydroCHLOROthiazide 12.5 MG Oral Capsule\par Levocetirizine Dihydrochloride 5 MG Oral Tablet; take 1 tablet by mouth at bedtime\par Olopatadine HCl - 0.2 % Ophthalmic Solution; INSTILL 1 DROP INTO AFFECTED EYE(S)\par ONCE DAILY AS DIRECTED\par Olopatadine HCl - 0.6 % Nasal Solution; one sniff each nostril b.i.d\par Olopatadine HCl - 0.6 % Nasal Solution; one sniff each nostril b.i.d\par Sertraline HCl - 50 MG Oral Tablet\par Sildenafil Citrate 100 MG Oral Tablet; TAKE 1 TABLET DAILY 1 HOUR BEFORE NEEDED\par Vitamin B-12 100 MCG Oral Tablet\par Zetia 10 MG Oral Tablet\par \par Allergies\par Penicillins\par abacavir\par . \par

## 2020-09-01 LAB — SARS-COV-2 N GENE NPH QL NAA+PROBE: NOT DETECTED

## 2020-09-02 ENCOUNTER — APPOINTMENT (OUTPATIENT)
Dept: INFECTIOUS DISEASE | Facility: CLINIC | Age: 75
End: 2020-09-02

## 2020-09-03 ENCOUNTER — EMERGENCY (EMERGENCY)
Facility: HOSPITAL | Age: 75
LOS: 1 days | Discharge: HOME | End: 2020-09-03
Attending: EMERGENCY MEDICINE
Payer: MEDICARE

## 2020-09-03 VITALS
HEART RATE: 85 BPM | RESPIRATION RATE: 18 BRPM | DIASTOLIC BLOOD PRESSURE: 78 MMHG | OXYGEN SATURATION: 98 % | SYSTOLIC BLOOD PRESSURE: 129 MMHG | TEMPERATURE: 98 F | WEIGHT: 151.02 LBS | HEIGHT: 67 IN

## 2020-09-03 VITALS
RESPIRATION RATE: 18 BRPM | SYSTOLIC BLOOD PRESSURE: 125 MMHG | OXYGEN SATURATION: 99 % | HEART RATE: 71 BPM | DIASTOLIC BLOOD PRESSURE: 86 MMHG

## 2020-09-03 DIAGNOSIS — Z20.9 CONTACT WITH AND (SUSPECTED) EXPOSURE TO UNSPECIFIED COMMUNICABLE DISEASE: ICD-10-CM

## 2020-09-03 DIAGNOSIS — Z90.49 ACQUIRED ABSENCE OF OTHER SPECIFIED PARTS OF DIGESTIVE TRACT: Chronic | ICD-10-CM

## 2020-09-03 DIAGNOSIS — Z98.890 OTHER SPECIFIED POSTPROCEDURAL STATES: Chronic | ICD-10-CM

## 2020-09-03 LAB
ALBUMIN SERPL ELPH-MCNC: 4.6 G/DL — SIGNIFICANT CHANGE UP (ref 3.3–5)
ALP SERPL-CCNC: 111 U/L — SIGNIFICANT CHANGE UP (ref 40–120)
ALT FLD-CCNC: 17 U/L — SIGNIFICANT CHANGE UP (ref 10–45)
ANION GAP SERPL CALC-SCNC: 12 MMOL/L — SIGNIFICANT CHANGE UP (ref 5–17)
AST SERPL-CCNC: 15 U/L — SIGNIFICANT CHANGE UP (ref 10–40)
BASE EXCESS BLDV CALC-SCNC: 3.8 MMOL/L — HIGH (ref -2–2)
BILIRUB SERPL-MCNC: 0.4 MG/DL — SIGNIFICANT CHANGE UP (ref 0.2–1.2)
BUN SERPL-MCNC: 19 MG/DL — SIGNIFICANT CHANGE UP (ref 7–23)
CA-I SERPL-SCNC: 1.27 MMOL/L — SIGNIFICANT CHANGE UP (ref 1.12–1.3)
CALCIUM SERPL-MCNC: 10 MG/DL — SIGNIFICANT CHANGE UP (ref 8.4–10.5)
CHLORIDE BLDV-SCNC: 101 MMOL/L — SIGNIFICANT CHANGE UP (ref 96–108)
CHLORIDE SERPL-SCNC: 97 MMOL/L — SIGNIFICANT CHANGE UP (ref 96–108)
CO2 BLDV-SCNC: 32 MMOL/L — HIGH (ref 22–30)
CO2 SERPL-SCNC: 27 MMOL/L — SIGNIFICANT CHANGE UP (ref 22–31)
CREAT SERPL-MCNC: 1.16 MG/DL — SIGNIFICANT CHANGE UP (ref 0.5–1.3)
GAS PNL BLDV: 139 MMOL/L — SIGNIFICANT CHANGE UP (ref 135–145)
GAS PNL BLDV: SIGNIFICANT CHANGE UP
GAS PNL BLDV: SIGNIFICANT CHANGE UP
GLUCOSE BLDV-MCNC: 112 MG/DL — HIGH (ref 70–99)
GLUCOSE SERPL-MCNC: 121 MG/DL — HIGH (ref 70–99)
HCO3 BLDV-SCNC: 31 MMOL/L — HIGH (ref 21–29)
HCT VFR BLDA CALC: 48 % — SIGNIFICANT CHANGE UP (ref 39–50)
HGB BLD CALC-MCNC: 15.6 G/DL — SIGNIFICANT CHANGE UP (ref 13–17)
LACTATE BLDV-MCNC: 1 MMOL/L — SIGNIFICANT CHANGE UP (ref 0.7–2)
PCO2 BLDV: 56 MMHG — HIGH (ref 35–50)
PH BLDV: 7.35 — SIGNIFICANT CHANGE UP (ref 7.35–7.45)
PO2 BLDV: <20 MMHG — LOW (ref 25–45)
POTASSIUM BLDV-SCNC: 4.2 MMOL/L — SIGNIFICANT CHANGE UP (ref 3.5–5.3)
POTASSIUM SERPL-MCNC: 4.4 MMOL/L — SIGNIFICANT CHANGE UP (ref 3.5–5.3)
POTASSIUM SERPL-SCNC: 4.4 MMOL/L — SIGNIFICANT CHANGE UP (ref 3.5–5.3)
PROT SERPL-MCNC: 8.7 G/DL — HIGH (ref 6–8.3)
SAO2 % BLDV: 26 % — LOW (ref 67–88)
SODIUM SERPL-SCNC: 136 MMOL/L — SIGNIFICANT CHANGE UP (ref 135–145)

## 2020-09-03 PROCEDURE — 85018 HEMOGLOBIN: CPT

## 2020-09-03 PROCEDURE — 84295 ASSAY OF SERUM SODIUM: CPT

## 2020-09-03 PROCEDURE — 82330 ASSAY OF CALCIUM: CPT

## 2020-09-03 PROCEDURE — 82947 ASSAY GLUCOSE BLOOD QUANT: CPT

## 2020-09-03 PROCEDURE — 80053 COMPREHEN METABOLIC PANEL: CPT

## 2020-09-03 PROCEDURE — 82435 ASSAY OF BLOOD CHLORIDE: CPT

## 2020-09-03 PROCEDURE — 93010 ELECTROCARDIOGRAM REPORT: CPT

## 2020-09-03 PROCEDURE — 82803 BLOOD GASES ANY COMBINATION: CPT

## 2020-09-03 PROCEDURE — 85014 HEMATOCRIT: CPT

## 2020-09-03 PROCEDURE — 83605 ASSAY OF LACTIC ACID: CPT

## 2020-09-03 PROCEDURE — 84132 ASSAY OF SERUM POTASSIUM: CPT

## 2020-09-03 PROCEDURE — 99284 EMERGENCY DEPT VISIT MOD MDM: CPT | Mod: GC

## 2020-09-03 PROCEDURE — 99284 EMERGENCY DEPT VISIT MOD MDM: CPT | Mod: 25

## 2020-09-03 PROCEDURE — 93005 ELECTROCARDIOGRAM TRACING: CPT

## 2020-09-03 RX ORDER — AZITHROMYCIN 500 MG/1
500 TABLET, FILM COATED ORAL
Qty: 2 | Refills: 0 | Status: ACTIVE | COMMUNITY
Start: 2020-09-03 | End: 1900-01-01

## 2020-09-03 NOTE — ED PROVIDER NOTE - PHYSICAL EXAMINATION
PHYSICAL EXAM:  GENERAL: NAD, lying in bed comfortably  HEAD:  Atraumatic, Normocephalic  EYES: EOMI, PERRLA, conjunctiva and sclera clear  ENT: Moist mucous membranes  NECK: Supple, No JVD  CHEST/LUNG: Clear to auscultation bilaterally; No rales, rhonchi, wheezing, or rubs. Unlabored respirations  HEART: Regular rate and rhythm; No murmurs, rubs, or gallops  ABDOMEN: Bowel sounds present; Soft, Nontender, Nondistended   EXTREMITIES:  2+ Peripheral Pulses, brisk capillary refill. No clubbing, cyanosis, or edema  NERVOUS SYSTEM:  Alert & Oriented X3, speech clear. No deficits   MSK: FROM all 4 extremities, full and equal strength  SKIN: No rashes or lesions

## 2020-09-03 NOTE — ED PROVIDER NOTE - NSFOLLOWUPINSTRUCTIONS_ED_ALL_ED_FT
Your potassium is normal.    Please follow up with Dr. Meng next week.     Return to the Emergency Department for worsening or persistent symptoms, and/or ANY NEW OR CONCERNING SYMPTOMS. If you have issues obtaining follow up, please call: [phone] to obtain a doctor or specialist who takes your insurance in your area.

## 2020-09-03 NOTE — ED PROVIDER NOTE - CARE PROVIDER_API CALL
Sarah Meng  INTERNAL MEDICINE  [address] [place] [address]  Phone: [phone]  Fax: [phone]  Follow Up Time:

## 2020-09-03 NOTE — ED PROVIDER NOTE - PATIENT PORTAL LINK FT
You can access the FollowMyHealth Patient Portal offered by [facility] by registering at the following website: [email or web address]. By joining [facility]’s FollowMyHealth portal, you will also be able to view your health information using other applications (apps) compatible with our system.

## 2020-09-03 NOTE — ED PROVIDER NOTE - PROGRESS NOTE DETAILS
EKG unchanged since previous visit, NSR, no peaked Ts; potassium level 4.4 on CMP. Joseph Frankel PGY2: Potasium wnl here. Spoke with nephro fellow who does not think patient needs to be seen given normal K. Will dc with Dr. Meng follow up. Discussed plan and return precautions with patient who understands and agrees. All questions answered.

## 2020-09-03 NOTE — ED PROVIDER NOTE - OBJECTIVE STATEMENT
Pt is a 71yo M with a Hx of HIV (Undetected viral load, on HAART, follows with ID clinic Dr. White), HTN, HLD, s/p surgery for ear cancer 2d ago, sent to ED for elevated potassium level of 6.  Patient went for routine blood work at ID clinic 3days ago and received a call that his potassium was elevated; they repeated labs yesterday which showed K = 6, so pt was instructed to come to ED. Pt states he has a Hx of hyperkalemia finding 1mo ago which resolved in the ED without intervention. He denies increased consumption of red meats/bananas/avocados, changes in medications, CP, SOB, palpitations, generalized weakness, vision changes, numbness/tingling.

## 2020-09-03 NOTE — ED PROVIDER NOTE - ATTENDING CONTRIBUTION TO CARE
I performed a history and physical exam of the patient and discussed their management with the resident and student . I reviewed the resident and student's note and agree with the documented findings and plan of care. My medical decision making and observations are found above.

## 2020-09-03 NOTE — ED ADULT TRIAGE NOTE - CHIEF COMPLAINT QUOTE
Sent by MD for high potassium level of 6, denies chest pain, SOB and fever.   Left ear surgery x 2 days ago, on Cipro.

## 2020-09-03 NOTE — ED PROVIDER NOTE - CLINICAL SUMMARY MEDICAL DECISION MAKING FREE TEXT BOX
Pt is a 76 yo M with a Hx of HIV on HAART, HTN, HLD, s/p ear surgery for cancer resection 2d ago on cipro, referred from ID clinic for hyperkalemia. Hyperk is likely due to hemolysis; low suspicion for metabolic/cardiac etiology given that pt is well-appearing with normal vitals. Will order EKG, CMP, VBG and reassess. Pt is a 76 yo M with a Hx of HIV on HAART, HTN, HLD, s/p ear surgery for cancer resection 2d ago on cipro, sent to ED for elevated potassium = 6. Hyperk is likely due to hemolysis; low suspicion for metabolic/cardiac etiology given that pt is well-appearing with normal vitals. Will order EKG, CMP, VBG and reassess. Pt is a 76 yo M with a Hx of HIV on HAART, HTN, HLD, s/p ear surgery for cancer resection 2d ago on cipro, sent to ED for elevated potassium = 6. Hyperk is likely due to hemolysis; low suspicion for metabolic/cardiac etiology given that pt is well-appearing with normal vitals. Will order EKG, CMP, VBG and reassess.  tricia 75 m w hiv ? prev episode of hyperk although endorses not requiring tx, had k elev 3 days ago and again last night at pcp - pt without ekg changes no symptoms - high suspicon of hemolysis - will ck cmp if neg dc

## 2020-09-03 NOTE — ED ADULT NURSE NOTE - OBJECTIVE STATEMENT
75YOM pmh HLD, HIV presents to ED c/o high potassium 6. Pt states he had similar episodes a month ago and was seen at ED and had resolved without any interventions or meds. Pt states she had blood work done three times and his potassium was 6 this morning. Sent by his MD for follow up in ED. Denies CP, numbness, tingling. Denies SOB, chills, fever, cough, abd pain, N/V/D, burning upon urination. Safety and comfort measures provided. Will continue to monitor. EKG done, placed on cardiac monitor.

## 2020-09-04 ENCOUNTER — APPOINTMENT (OUTPATIENT)
Dept: PULMONOLOGY | Facility: CLINIC | Age: 75
End: 2020-09-04
Payer: MEDICARE

## 2020-09-04 PROCEDURE — 94727 GAS DIL/WSHOT DETER LNG VOL: CPT

## 2020-09-04 PROCEDURE — 94010 BREATHING CAPACITY TEST: CPT

## 2020-09-04 PROCEDURE — 94729 DIFFUSING CAPACITY: CPT

## 2020-09-08 LAB
ALBUMIN SERPL ELPH-MCNC: 4.9 G/DL
ALBUMIN SERPL ELPH-MCNC: 5.4 G/DL
ALP BLD-CCNC: 115 U/L
ALP BLD-CCNC: 131 U/L
ALT SERPL-CCNC: 19 U/L
ALT SERPL-CCNC: 21 U/L
ANION GAP SERPL CALC-SCNC: 10 MMOL/L
ANION GAP SERPL CALC-SCNC: 14 MMOL/L
AST SERPL-CCNC: 19 U/L
AST SERPL-CCNC: 22 U/L
BASOPHILS # BLD AUTO: 0.03 K/UL
BASOPHILS NFR BLD AUTO: 0.6 %
BILIRUB SERPL-MCNC: 0.3 MG/DL
BILIRUB SERPL-MCNC: 0.4 MG/DL
BUN SERPL-MCNC: 15 MG/DL
BUN SERPL-MCNC: 16 MG/DL
C TRACH RRNA SPEC QL NAA+PROBE: DETECTED
C TRACH RRNA SPEC QL NAA+PROBE: NOT DETECTED
CALCIUM SERPL-MCNC: 10.2 MG/DL
CALCIUM SERPL-MCNC: 10.5 MG/DL
CHLORIDE SERPL-SCNC: 100 MMOL/L
CHLORIDE SERPL-SCNC: 100 MMOL/L
CO2 SERPL-SCNC: 26 MMOL/L
CO2 SERPL-SCNC: 28 MMOL/L
CREAT SERPL-MCNC: 1.05 MG/DL
CREAT SERPL-MCNC: 1.16 MG/DL
EOSINOPHIL # BLD AUTO: 0.16 K/UL
EOSINOPHIL NFR BLD AUTO: 3.2 %
GLUCOSE SERPL-MCNC: 109 MG/DL
GLUCOSE SERPL-MCNC: 98 MG/DL
HCT VFR BLD CALC: 53 %
HCV AB SER QL: ABNORMAL
HCV S/CO RATIO: 1.67 S/CO
HGB BLD-MCNC: 16.1 G/DL
HPV DNA, HIGH RISK, LOW RISK, ANAL: DETECTED
HSV+VZV DNA SPEC QL NAA+PROBE: NOT DETECTED
IMM GRANULOCYTES NFR BLD AUTO: 0.2 %
LYMPHOCYTES # BLD AUTO: 1.63 K/UL
LYMPHOCYTES NFR BLD AUTO: 32.9 %
MAGNESIUM SERPL-MCNC: 2.3 MG/DL
MAN DIFF?: NORMAL
MCHC RBC-ENTMCNC: 28.2 PG
MCHC RBC-ENTMCNC: 30.4 GM/DL
MCV RBC AUTO: 93 FL
MONOCYTES # BLD AUTO: 0.28 K/UL
MONOCYTES NFR BLD AUTO: 5.6 %
N GONORRHOEA RRNA SPEC QL NAA+PROBE: NOT DETECTED
N GONORRHOEA RRNA SPEC QL NAA+PROBE: NOT DETECTED
NEUTROPHILS # BLD AUTO: 2.85 K/UL
NEUTROPHILS NFR BLD AUTO: 57.5 %
PLATELET # BLD AUTO: 305 K/UL
POTASSIUM SERPL-SCNC: 5.6 MMOL/L
POTASSIUM SERPL-SCNC: 6 MMOL/L
POTASSIUM SERPL-SCNC: 6 MMOL/L
PROT SERPL-MCNC: 8.8 G/DL
PROT SERPL-MCNC: 9.7 G/DL
RBC # BLD: 5.7 M/UL
RBC # FLD: 14.7 %
SARS-COV-2 IGG SERPL IA-ACNC: 0.07 INDEX
SARS-COV-2 IGG SERPL QL IA: NEGATIVE
SODIUM SERPL-SCNC: 138 MMOL/L
SODIUM SERPL-SCNC: 140 MMOL/L
SOURCE ANAL: NORMAL
SOURCE ORAL: NORMAL
SPECIMEN SOURCE: NORMAL
T PALLIDUM AB SER QL IA: NEGATIVE
WBC # FLD AUTO: 4.96 K/UL

## 2020-09-18 ENCOUNTER — APPOINTMENT (OUTPATIENT)
Dept: NEPHROLOGY | Facility: CLINIC | Age: 75
End: 2020-09-18
Payer: MEDICARE

## 2020-09-18 VITALS
HEART RATE: 75 BPM | WEIGHT: 154 LBS | BODY MASS INDEX: 25.63 KG/M2 | DIASTOLIC BLOOD PRESSURE: 73 MMHG | SYSTOLIC BLOOD PRESSURE: 129 MMHG | OXYGEN SATURATION: 97 %

## 2020-09-18 DIAGNOSIS — E87.5 HYPERKALEMIA: ICD-10-CM

## 2020-09-18 PROCEDURE — 99203 OFFICE O/P NEW LOW 30 MIN: CPT

## 2020-09-18 NOTE — HISTORY OF PRESENT ILLNESS
[FreeTextEntry1] : Contacts:\par \par \par -------------------------------------------------------------------------------\par Problem List:\par 	Chronic hyperkalemia\par 	Hypertension\par 	Diabetes mellitus\par 	Dyslipidemia\par 	HIV\par 	GERD\par \par -------------------------------------------------------------------------------\par HPI:\par Mr. Pace is a 75 year old man with chronic hyperkalemia, hypertension, a history of DM (although since recovered), dyslipidemia, HIV, GERD, here for hyperkalemia evaluation.\par \par Mr. Pace has chronic hyperkalemia, with elevated serum potassium dating back to 2016. Most often the potassium is in the 5.5-6 range, or thereabouts, with occasional dips to about 5 (and once or twice to 4). During this time, his creatinine has been somewhat labile, and once or twice reaching up to 1.5, but largely in the 1-1.2 range. His serum bicarbonate is in the 23-26 range. (A review of old scanned chart reveals what appears to be workup for hyperkalemia back in about 2011.)\par \par Most recently, his potassium was 6 on Sept. 2, and he was sent to ED. On repeat, his potassium was 4.4 and he was discharged home.\par \par Otherwise well without complaints. No lightheadedness, dizziness, headache, chest pain, dyspnea, cough, abdominal pain, nausea, vomiting, diarrhea, constipation, dysuria, hematuria, leg swelling. Doesn't use NSAIDs. No notable supplements, except for Spirulina (contains small amount of potassium). \par \par ROS: All other systems were reviewed and are negative, except as per HPI.\par 	\par -------------------------------------------------------------------------------\par Social History:\par 	From [place]; came [place] about [place]\par 	Retired; formerly owned dry cleaning\par 	No history of tobacco\par 	Uses marijuana\par \par Family History:\par 	No known history of renal disease, hematuria, proteinuria, ESRD, dialysis, transplant\par \par -------------------------------------------------------------------------------\par Allergies: PCN, Abacavir\par \par Medications: [reviewed]\par 	\par -------------------------------------------------------------------------------\par Physical Exam:\par 	Gen: NAD\par 	HEENT: Supple neck\par 	Pulm: CTA\par 	CV: RRR\par 	Back: No spinal or CVA terndeness\par 	Abd: +BS, soft, nontender/nondistended\par 	UE: Warm, FROM, intact strength\par 	LE: Warm, FROM, intact strength; no edema\par 	Neuro: No focal deficits, intact gait\par 	Psych: Normal affect\par 	Skin: Warm, without rashes\par 	\par -------------------------------------------------------------------------------\par Labs/Studies: [reviewed]\par \par \par

## 2020-09-18 NOTE — ASSESSMENT
[FreeTextEntry1] : Mr. Pace is a 75 year old man with chronic hyperkalemia, hypertension, a history of DM (although since recovered), dyslipidemia, HIV, GERD, here for hyperkalemia evaluation.\par \par Long-standing hyperkalemia, seems to resolve when sent to ED. Perhaps related to cell fragility and how fast test is run. Would also check serum + plasma potassium simultaneously today. Sending off STAT. No obvious medication or dietary causes contributing. If pseudo-K not the case, could have genetic component of defect in K+ handling.\par \par Check labs today with follow up pending results\par \par \par \par I have spent a total of 30 minutes in which >50% was spent in discussion with patient regarding hyperkalemia.

## 2020-09-24 ENCOUNTER — APPOINTMENT (OUTPATIENT)
Dept: SURGERY | Facility: CLINIC | Age: 75
End: 2020-09-24
Payer: MEDICARE

## 2020-09-24 DIAGNOSIS — K62.89 OTHER SPECIFIED DISEASES OF ANUS AND RECTUM: ICD-10-CM

## 2020-09-24 PROCEDURE — 46600 DIAGNOSTIC ANOSCOPY SPX: CPT

## 2020-09-24 PROCEDURE — 99213 OFFICE O/P EST LOW 20 MIN: CPT | Mod: 25

## 2020-09-24 RX ORDER — ASPIRIN 81 MG/1
81 TABLET, CHEWABLE ORAL
Refills: 0 | Status: ACTIVE | COMMUNITY

## 2020-09-24 NOTE — PHYSICAL EXAM
[FreeTextEntry1] : Perianal inspection digital exam and anoscopy revealed irregular distal rectal mucosa.  Anoscopy demonstrated scarring.  However visualization was difficult because of residual stool.

## 2020-09-24 NOTE — HISTORY OF PRESENT ILLNESS
[FreeTextEntry1] : Bertin is a 74 y/o male, hx of AIDS. Here for evaluation of rectal bleeding. Patient is s/p posterior intersphincteric fistulotomy and hemorrhoidectomy on 5/9/17. Patient has had proctitis from STDs treated by infectious diseases. Patient was recently treated for cytomegalovirus. Last seen on 8/27/20, origin of patient;s current rectal ulcerations/proctitis was unclear. Likely either infectious or neoplastic. Sloughed tissue from anoscopy was sent for pathology. It was recommended patient follow-up with infectious disease for STD cultures. Rectum biopsy demonstrated granulation tissue with prominent lymphoplasmacytic inflammation. Negative for viral cytopathic effect. Negative for malignancy. \par \par Today patient reports feeling well. He was treated with Azithromycin for Chlamydia. Reports bleeding has resolved. Denies rectal pain.

## 2020-09-25 LAB
ALBUMIN SERPL ELPH-MCNC: 5 G/DL
ANION GAP SERPL CALC-SCNC: 14 MMOL/L
APPEARANCE: CLEAR
BACTERIA: NEGATIVE
BILIRUBIN URINE: NEGATIVE
BLOOD URINE: NEGATIVE
BUN SERPL-MCNC: 20 MG/DL
CALCIUM SERPL-MCNC: 10.2 MG/DL
CHLORIDE SERPL-SCNC: 99 MMOL/L
CO2 SERPL-SCNC: 27 MMOL/L
COLOR: YELLOW
CREAT SERPL-MCNC: 1.19 MG/DL
CREAT SPEC-SCNC: 104 MG/DL
CREAT SPEC-SCNC: 104 MG/DL
CREAT/PROT UR: 0.2 RATIO
GLUCOSE QUALITATIVE U: NEGATIVE
GLUCOSE SERPL-MCNC: 126 MG/DL
HYALINE CASTS: 0 /LPF
KETONES URINE: NEGATIVE
LEUKOCYTE ESTERASE URINE: NEGATIVE
MICROALBUMIN 24H UR DL<=1MG/L-MCNC: 5.1 MG/DL
MICROALBUMIN/CREAT 24H UR-RTO: 49 MG/G
MICROSCOPIC-UA: NORMAL
NITRITE URINE: NEGATIVE
PH URINE: 6
PHOSPHATE SERPL-MCNC: 3.4 MG/DL
POTASSIUM SERPL-SCNC: 5.4 MMOL/L
POTASSIUM SERPL-SCNC: 5.5 MMOL/L
PROT UR-MCNC: 22 MG/DL
PROTEIN URINE: NORMAL
RED BLOOD CELLS URINE: 2 /HPF
SODIUM SERPL-SCNC: 139 MMOL/L
SPECIFIC GRAVITY URINE: 1.02
SQUAMOUS EPITHELIAL CELLS: 0 /HPF
UROBILINOGEN URINE: NORMAL
WHITE BLOOD CELLS URINE: 0 /HPF

## 2020-10-27 ENCOUNTER — APPOINTMENT (OUTPATIENT)
Dept: INFECTIOUS DISEASE | Facility: CLINIC | Age: 75
End: 2020-10-27
Payer: MEDICARE

## 2020-10-27 VITALS
WEIGHT: 158 LBS | DIASTOLIC BLOOD PRESSURE: 78 MMHG | SYSTOLIC BLOOD PRESSURE: 138 MMHG | BODY MASS INDEX: 26.33 KG/M2 | TEMPERATURE: 97.7 F | HEIGHT: 65 IN | OXYGEN SATURATION: 98 % | HEART RATE: 89 BPM

## 2020-10-27 PROCEDURE — 99214 OFFICE O/P EST MOD 30 MIN: CPT

## 2020-10-27 NOTE — HISTORY OF PRESENT ILLNESS
[FreeTextEntry1] : please send all STD test results to Paolo [facility] fax [phone]\par 10/27/2020----75 yoM with HIV/AIDS (dx 2016, CD4 leslie 130), taking Biktarvy daily  HTN, HLA, COPD, GERD, DM, daily marijuana use, here for f/u. Virally suppressed on Biktarvy, no missed doses. No reported side effects. Pt Went Friday 11/8/2019 for bladder biopsy to rule out cancer and was negative.\par Just seen by otho to rule out CMV and all was negative as per pt. Recent chest X -Ray was normal \par  PCP is in at [address] Dr. Julia Novoa [phone]. FAX- [phone]\par Dr Max [phone] is GI and is going this week -[phone]\par Pulmonary Dr. Adrien Mack phone [phone] FAX- [phone]. \par In June 2020 pt was seen for rectal pain and bleeding in [place] and a sigmoidoscopy was completed on 6/4/20 at [facility] by surgeon Cabrera White and CMV was noted on the results of the rectal biopsy and started with valganciclovir 450mg tablets for 42 days, \par Just had flu vaccine in sept 2020 at outside pharmacy. \par plan 10/27/2020: \par 1) labs today and all STD testing  and continue Biktarvy \par 2) see in person in 4 months. \par 3) I will have Alvaro Killian call him regarding sexual practices and questions on sex\par \par Has colonoscopy scheduled for 6/18/19\par Dental appt pending \par Completed a second HepB series with PCP last year. \par Smokes marijuana daily. Former cigarette smoker quit 40 years ago. Drinks a few times per week\par Lives in [place] Nov-May. Wife stays in [place] most of the time.\par Pt sometimes has male partners while in [place], meets at club. Occasionally uses condoms. Oral/anal verse \par \par Sexual History: Not sexually active with wife. Casual male partners The patient is sexually active and is not monogamous. The patient is using condoms for some encounters. \par Occupation: retired. \par Lives with wife HIV neg aware of pt status. \par

## 2020-10-28 ENCOUNTER — EMERGENCY (EMERGENCY)
Facility: HOSPITAL | Age: 75
LOS: 1 days | Discharge: HOME | End: 2020-10-28
Attending: EMERGENCY MEDICINE
Payer: MEDICARE

## 2020-10-28 ENCOUNTER — NON-APPOINTMENT (OUTPATIENT)
Age: 75
End: 2020-10-28

## 2020-10-28 VITALS
RESPIRATION RATE: 16 BRPM | WEIGHT: 154.98 LBS | OXYGEN SATURATION: 98 % | HEIGHT: 67 IN | TEMPERATURE: 98 F | DIASTOLIC BLOOD PRESSURE: 69 MMHG | SYSTOLIC BLOOD PRESSURE: 162 MMHG | HEART RATE: 78 BPM

## 2020-10-28 VITALS
HEART RATE: 68 BPM | SYSTOLIC BLOOD PRESSURE: 132 MMHG | RESPIRATION RATE: 16 BRPM | OXYGEN SATURATION: 98 % | DIASTOLIC BLOOD PRESSURE: 73 MMHG

## 2020-10-28 DIAGNOSIS — Z98.890 OTHER SPECIFIED POSTPROCEDURAL STATES: Chronic | ICD-10-CM

## 2020-10-28 DIAGNOSIS — Z90.49 ACQUIRED ABSENCE OF OTHER SPECIFIED PARTS OF DIGESTIVE TRACT: Chronic | ICD-10-CM

## 2020-10-28 LAB
ALBUMIN SERPL ELPH-MCNC: 5.1 G/DL
ALP BLD-CCNC: 115 U/L
ALT SERPL-CCNC: 24 U/L
ANION GAP SERPL CALC-SCNC: 14 MMOL/L
ANION GAP SERPL CALC-SCNC: 9 MMOL/L — SIGNIFICANT CHANGE UP (ref 5–17)
APPEARANCE: CLEAR
AST SERPL-CCNC: 23 U/L
BACTERIA: NEGATIVE
BASOPHILS # BLD AUTO: 0.03 K/UL
BASOPHILS # BLD AUTO: 0.04 K/UL — SIGNIFICANT CHANGE UP (ref 0–0.2)
BASOPHILS NFR BLD AUTO: 0.6 %
BASOPHILS NFR BLD AUTO: 0.8 % — SIGNIFICANT CHANGE UP (ref 0–2)
BILIRUB SERPL-MCNC: 0.4 MG/DL
BILIRUBIN URINE: NEGATIVE
BLOOD URINE: NEGATIVE
BUN SERPL-MCNC: 18 MG/DL
BUN SERPL-MCNC: 18 MG/DL — SIGNIFICANT CHANGE UP (ref 7–23)
C TRACH RRNA SPEC QL NAA+PROBE: NOT DETECTED
CALCIUM SERPL-MCNC: 10 MG/DL
CALCIUM SERPL-MCNC: 9.8 MG/DL — SIGNIFICANT CHANGE UP (ref 8.4–10.5)
CD3 CELLS # BLD: 1467 /UL
CD3 CELLS NFR BLD: 74 %
CD3+CD4+ CELLS # BLD: 563 /UL
CD3+CD4+ CELLS NFR BLD: 28 %
CD3+CD4+ CELLS/CD3+CD8+ CLL SPEC: 0.67 RATIO
CD3+CD8+ CELLS # SPEC: 844 /UL
CD3+CD8+ CELLS NFR BLD: 42 %
CHLORIDE SERPL-SCNC: 100 MMOL/L
CHLORIDE SERPL-SCNC: 100 MMOL/L — SIGNIFICANT CHANGE UP (ref 96–108)
CO2 SERPL-SCNC: 26 MMOL/L
CO2 SERPL-SCNC: 26 MMOL/L — SIGNIFICANT CHANGE UP (ref 22–31)
COLOR: NORMAL
CREAT SERPL-MCNC: 1.08 MG/DL — SIGNIFICANT CHANGE UP (ref 0.5–1.3)
CREAT SERPL-MCNC: 1.13 MG/DL
EOSINOPHIL # BLD AUTO: 0.09 K/UL — SIGNIFICANT CHANGE UP (ref 0–0.5)
EOSINOPHIL # BLD AUTO: 0.12 K/UL
EOSINOPHIL NFR BLD AUTO: 1.9 % — SIGNIFICANT CHANGE UP (ref 0–6)
EOSINOPHIL NFR BLD AUTO: 2.5 %
GLUCOSE QUALITATIVE U: NEGATIVE
GLUCOSE SERPL-MCNC: 115 MG/DL
GLUCOSE SERPL-MCNC: 140 MG/DL — HIGH (ref 70–99)
HCT VFR BLD CALC: 49.5 % — SIGNIFICANT CHANGE UP (ref 39–50)
HCT VFR BLD CALC: 50.9 %
HCV AB SER QL: NONREACTIVE
HCV RNA SERPL NAA DL=5-ACNC: NOT DETECTED
HCV RNA SERPL NAA+PROBE-LOG IU: NOT DETECTED LOG10IU/ML
HCV S/CO RATIO: 0.92 S/CO
HGB BLD-MCNC: 15.5 G/DL
HGB BLD-MCNC: 15.8 G/DL — SIGNIFICANT CHANGE UP (ref 13–17)
HIV1 RNA # SERPL NAA+PROBE: ABNORMAL
HIV1 RNA # SERPL NAA+PROBE: ABNORMAL COPIES/ML
HYALINE CASTS: 0 /LPF
IMM GRANULOCYTES NFR BLD AUTO: 0.2 %
IMM GRANULOCYTES NFR BLD AUTO: 0.2 % — SIGNIFICANT CHANGE UP (ref 0–1.5)
KETONES URINE: NEGATIVE
LEUKOCYTE ESTERASE URINE: NEGATIVE
LYMPHOCYTES # BLD AUTO: 1.4 K/UL — SIGNIFICANT CHANGE UP (ref 1–3.3)
LYMPHOCYTES # BLD AUTO: 2.2 K/UL
LYMPHOCYTES # BLD AUTO: 29.1 % — SIGNIFICANT CHANGE UP (ref 13–44)
LYMPHOCYTES NFR BLD AUTO: 45.2 %
MAN DIFF?: NORMAL
MCHC RBC-ENTMCNC: 28 PG
MCHC RBC-ENTMCNC: 28.5 PG — SIGNIFICANT CHANGE UP (ref 27–34)
MCHC RBC-ENTMCNC: 30.5 GM/DL
MCHC RBC-ENTMCNC: 31.9 GM/DL — LOW (ref 32–36)
MCV RBC AUTO: 89.4 FL — SIGNIFICANT CHANGE UP (ref 80–100)
MCV RBC AUTO: 92 FL
MICROSCOPIC-UA: NORMAL
MONOCYTES # BLD AUTO: 0.25 K/UL — SIGNIFICANT CHANGE UP (ref 0–0.9)
MONOCYTES # BLD AUTO: 0.33 K/UL
MONOCYTES NFR BLD AUTO: 5.2 % — SIGNIFICANT CHANGE UP (ref 2–14)
MONOCYTES NFR BLD AUTO: 6.8 %
N GONORRHOEA RRNA SPEC QL NAA+PROBE: NOT DETECTED
NEUTROPHILS # BLD AUTO: 2.18 K/UL
NEUTROPHILS # BLD AUTO: 3.02 K/UL — SIGNIFICANT CHANGE UP (ref 1.8–7.4)
NEUTROPHILS NFR BLD AUTO: 44.7 %
NEUTROPHILS NFR BLD AUTO: 62.8 % — SIGNIFICANT CHANGE UP (ref 43–77)
NITRITE URINE: NEGATIVE
NRBC # BLD: 0 /100 WBCS — SIGNIFICANT CHANGE UP (ref 0–0)
PH URINE: 6.5
PLATELET # BLD AUTO: 188 K/UL — SIGNIFICANT CHANGE UP (ref 150–400)
PLATELET # BLD AUTO: 217 K/UL
POTASSIUM SERPL-MCNC: 4.6 MMOL/L — SIGNIFICANT CHANGE UP (ref 3.5–5.3)
POTASSIUM SERPL-SCNC: 4.6 MMOL/L — SIGNIFICANT CHANGE UP (ref 3.5–5.3)
POTASSIUM SERPL-SCNC: 6.1 MMOL/L
PROT SERPL-MCNC: 8.4 G/DL
PROTEIN URINE: NEGATIVE
RBC # BLD: 5.53 M/UL
RBC # BLD: 5.54 M/UL — SIGNIFICANT CHANGE UP (ref 4.2–5.8)
RBC # FLD: 12.9 % — SIGNIFICANT CHANGE UP (ref 10.3–14.5)
RBC # FLD: 13.3 %
RED BLOOD CELLS URINE: 1 /HPF
SODIUM SERPL-SCNC: 135 MMOL/L — SIGNIFICANT CHANGE UP (ref 135–145)
SODIUM SERPL-SCNC: 140 MMOL/L
SOURCE AMPLIFICATION: NORMAL
SPECIFIC GRAVITY URINE: 1.01
SQUAMOUS EPITHELIAL CELLS: 0 /HPF
T PALLIDUM AB SER QL IA: NEGATIVE
UROBILINOGEN URINE: NORMAL
VIRAL LOAD INTERP: NORMAL
VIRAL LOAD LOG: ABNORMAL LG COP/ML
WBC # BLD: 4.81 K/UL — SIGNIFICANT CHANGE UP (ref 3.8–10.5)
WBC # FLD AUTO: 4.81 K/UL — SIGNIFICANT CHANGE UP (ref 3.8–10.5)
WBC # FLD AUTO: 4.87 K/UL
WHITE BLOOD CELLS URINE: 0 /HPF

## 2020-10-28 PROCEDURE — 80048 BASIC METABOLIC PNL TOTAL CA: CPT

## 2020-10-28 PROCEDURE — 99284 EMERGENCY DEPT VISIT MOD MDM: CPT

## 2020-10-28 PROCEDURE — 99284 EMERGENCY DEPT VISIT MOD MDM: CPT | Mod: 25

## 2020-10-28 PROCEDURE — 93010 ELECTROCARDIOGRAM REPORT: CPT

## 2020-10-28 PROCEDURE — 93005 ELECTROCARDIOGRAM TRACING: CPT

## 2020-10-28 PROCEDURE — 85025 COMPLETE CBC W/AUTO DIFF WBC: CPT

## 2020-10-28 NOTE — ED ADULT NURSE NOTE - OBJECTIVE STATEMENT
74 y/o male with pmhx of HIV+ sent by pmd for further assessment of elevated K=6.1.  pt denies any cp, numbness or tingling, n/v/d or urinary alterations at this time.  pt states that he was feeling a little dizzy with mild occipital h/a.  pt is awake, alert and responsive to all stimuli.  no sob or respiratory distress noted.  vss.  safety precautions in place.  will continue to monitor.

## 2020-10-28 NOTE — ED PROVIDER NOTE - OBJECTIVE STATEMENT
74 yo male with PMHx of HTN, HLD, HIV on HAART p/w elevated potassium.  Patient reports that he went in for routine blood work with his ID doctor and received a call today that his potassium was elevated.  Patient states that this has happened a few times in the past, but every time he comes to the hospital his repeat labs are normal.  Patient states that he has some slight dizziness, but otherwise feels fine.  Denies headache, blurry vision, CP, SOB, palpitations, cough, abd pain NVD.

## 2020-10-28 NOTE — ED PROVIDER NOTE - ATTENDING CONTRIBUTION TO CARE
attending Tanya: 75yM h/o HTN, HLD, HIV on HAART p/w elevated potassium on routine outpatient bloodwork. Pt reports mild dizziness otherwise feels fine. Will obtain ekg, repeat labs, reassess

## 2020-10-28 NOTE — ED PROVIDER NOTE - NSFOLLOWUPINSTRUCTIONS_ED_ALL_ED_FT
1.  Stay well hydrated  2.  Continue current home medications  3.  Follow up with your PMD and ID upon discharge.  4.  Return to the ER for dizziness/lightheadedness, palpitations, chest pain, shortness of breath or any other concerning symptoms

## 2020-10-28 NOTE — ED PROVIDER NOTE - PATIENT PORTAL LINK FT
You can access the FollowMyHealth Patient Portal offered by [facility] by registering at the following website: [email or web address]. By joining [employer]s FollowMyHealth portal, you will also be able to view your health information using other applications (apps) compatible with our system.

## 2020-10-28 NOTE — ED PROVIDER NOTE - PROGRESS NOTE DETAILS
Patient seen at bedside in NAD.  VSS.  Patient resting comfortably without complaints. Potassium 4.6.  patient feeling well.  Will DC with close outpatient follow up.  Strict return precautions provided.  -Oliver Barber PA-C

## 2020-10-29 ENCOUNTER — NON-APPOINTMENT (OUTPATIENT)
Age: 75
End: 2020-10-29

## 2020-10-29 LAB
C TRACH RRNA SPEC QL NAA+PROBE: NOT DETECTED
C TRACH RRNA SPEC QL NAA+PROBE: NOT DETECTED
N GONORRHOEA RRNA SPEC QL NAA+PROBE: NOT DETECTED
N GONORRHOEA RRNA SPEC QL NAA+PROBE: NOT DETECTED
SOURCE ANAL: NORMAL
SOURCE ORAL: NORMAL

## 2020-11-09 ENCOUNTER — RX RENEWAL (OUTPATIENT)
Age: 75
End: 2020-11-09

## 2020-11-23 ENCOUNTER — APPOINTMENT (OUTPATIENT)
Dept: PULMONOLOGY | Facility: CLINIC | Age: 75
End: 2020-11-23

## 2021-02-02 ENCOUNTER — RX RENEWAL (OUTPATIENT)
Age: 76
End: 2021-02-02

## 2021-02-03 ENCOUNTER — FORM ENCOUNTER (OUTPATIENT)
Age: 76
End: 2021-02-03

## 2021-02-04 ENCOUNTER — NON-APPOINTMENT (OUTPATIENT)
Age: 76
End: 2021-02-04

## 2021-02-04 ENCOUNTER — APPOINTMENT (OUTPATIENT)
Dept: INFECTIOUS DISEASE | Facility: CLINIC | Age: 76
End: 2021-02-04
Payer: MEDICARE

## 2021-02-04 VITALS
WEIGHT: 153 LBS | HEIGHT: 65 IN | SYSTOLIC BLOOD PRESSURE: 134 MMHG | BODY MASS INDEX: 25.49 KG/M2 | OXYGEN SATURATION: 99 % | TEMPERATURE: 97.5 F | HEART RATE: 81 BPM | DIASTOLIC BLOOD PRESSURE: 81 MMHG

## 2021-02-04 LAB
BASOPHILS # BLD AUTO: 0.04 K/UL
BASOPHILS NFR BLD AUTO: 0.9 %
EOSINOPHIL # BLD AUTO: 0.09 K/UL
EOSINOPHIL NFR BLD AUTO: 2 %
HCT VFR BLD CALC: 49.7 %
HGB BLD-MCNC: 15.3 G/DL
IMM GRANULOCYTES NFR BLD AUTO: 0.2 %
LYMPHOCYTES # BLD AUTO: 1.94 K/UL
LYMPHOCYTES NFR BLD AUTO: 42.3 %
MAN DIFF?: NORMAL
MCHC RBC-ENTMCNC: 28.8 PG
MCHC RBC-ENTMCNC: 30.8 GM/DL
MCV RBC AUTO: 93.6 FL
MONOCYTES # BLD AUTO: 0.3 K/UL
MONOCYTES NFR BLD AUTO: 6.5 %
NEUTROPHILS # BLD AUTO: 2.21 K/UL
NEUTROPHILS NFR BLD AUTO: 48.1 %
PLATELET # BLD AUTO: 227 K/UL
PSA SERPL-MCNC: 3.25 NG/ML
RBC # BLD: 5.31 M/UL
RBC # FLD: 12.6 %
WBC # FLD AUTO: 4.59 K/UL

## 2021-02-04 PROCEDURE — 99214 OFFICE O/P EST MOD 30 MIN: CPT

## 2021-02-04 NOTE — HISTORY OF PRESENT ILLNESS
[FreeTextEntry1] : 2/4/2021\par please send all STD test results to Paolo Barger fax [phone]\par 75 yoM with HIV/AIDS (dx 2016, CD4 leslie 130), taking Biktarvy daily HTN, HLA, COPD, GERD, DM, daily marijuana use, here for f/u. Virally suppressed on Biktarvy, no missed doses. No reported side effects. Pt Went Friday 11/8/2019 for bladder biopsy to rule out cancer and was negative.\par Just seen by otho to rule out CMV and all was negative as per pt. Recent chest X -Ray was normal \par  PCP is in at [address] Dr. Julia Novoa [phone]. FAX- [phone]\par Dr Max [phone] is GI and is going this week -[phone]\par Pulmonary Dr. Adrien Mack phone [phone] FAX- [phone]. \par In June 2020 pt was seen for rectal pain and bleeding in [place] and a sigmoidoscopy was completed on 6/4/20 at [facility] [place] [facility] by surgeon Cabrera White and CMV was noted on the results of the rectal biopsy and started with valganciclovir 450mg tablets for 42 days, completed valcyclovir and states feeling really good. \par Just had flu vaccine in sept 2020 at outside pharmacy. \par Just had first Pfizer vaccine second due on Feb 12th 2021\par plan 2/4/2021: \par 1) labs today and all STD testing and continue Biktarvy \par 2) see in person in 4 months. \par \par \par Has colonoscopy scheduled for 6/18/19\par Dental appt pending \par Completed a second HepB series with PCP last year. \par Smokes marijuana daily. Former cigarette smoker quit 40 years ago. Drinks a few times per week\par Lives in [place] Nov-May. Wife stays in [place] most of the time.\par Pt sometimes has male partners while in [place], meets at club. Occasionally uses condoms. Oral/anal verse \par \par Sexual History: Not sexually active with wife. Casual male partners The patient is sexually active and is not monogamous. The patient is using condoms for some encounters. \par Occupation: retired. \par Lives with wife HIV neg aware of pt status. \par \par

## 2021-02-05 ENCOUNTER — NON-APPOINTMENT (OUTPATIENT)
Age: 76
End: 2021-02-05

## 2021-02-05 LAB
25(OH)D3 SERPL-MCNC: 48.1 NG/ML
ALBUMIN SERPL ELPH-MCNC: 5 G/DL
ALP BLD-CCNC: 122 U/L
ALT SERPL-CCNC: 17 U/L
ANION GAP SERPL CALC-SCNC: 13 MMOL/L
APPEARANCE: CLEAR
AST SERPL-CCNC: 18 U/L
BACTERIA: NEGATIVE
BILIRUB SERPL-MCNC: 0.5 MG/DL
BILIRUBIN URINE: NEGATIVE
BLOOD URINE: NEGATIVE
BUN SERPL-MCNC: 17 MG/DL
C TRACH RRNA SPEC QL NAA+PROBE: NOT DETECTED
CALCIUM SERPL-MCNC: 10.2 MG/DL
CD3 CELLS # BLD: 1451 /UL
CD3 CELLS NFR BLD: 73 %
CD3+CD4+ CELLS # BLD: 610 /UL
CD3+CD4+ CELLS NFR BLD: 31 %
CD3+CD4+ CELLS/CD3+CD8+ CLL SPEC: 0.78 RATIO
CD3+CD8+ CELLS # SPEC: 783 /UL
CD3+CD8+ CELLS NFR BLD: 39 %
CHLORIDE SERPL-SCNC: 102 MMOL/L
CO2 SERPL-SCNC: 25 MMOL/L
COLOR: YELLOW
CREAT SERPL-MCNC: 1.23 MG/DL
ESTIMATED AVERAGE GLUCOSE: 105 MG/DL
GLUCOSE QUALITATIVE U: NEGATIVE
GLUCOSE SERPL-MCNC: 120 MG/DL
HBA1C MFR BLD HPLC: 5.3 %
HIV1 RNA # SERPL NAA+PROBE: ABNORMAL
HIV1 RNA # SERPL NAA+PROBE: ABNORMAL COPIES/ML
HYALINE CASTS: 0 /LPF
KETONES URINE: NEGATIVE
LEUKOCYTE ESTERASE URINE: NEGATIVE
MICROSCOPIC-UA: NORMAL
N GONORRHOEA RRNA SPEC QL NAA+PROBE: NOT DETECTED
NITRITE URINE: NEGATIVE
PH URINE: 6.5
POTASSIUM SERPL-SCNC: 5.8 MMOL/L
PROT SERPL-MCNC: 8.5 G/DL
PROTEIN URINE: NORMAL
RAPID RVP RESULT: NOT DETECTED
RED BLOOD CELLS URINE: 1 /HPF
SARS-COV-2 RNA PNL RESP NAA+PROBE: NOT DETECTED
SODIUM SERPL-SCNC: 141 MMOL/L
SOURCE AMPLIFICATION: NORMAL
SPECIFIC GRAVITY URINE: 1.02
SQUAMOUS EPITHELIAL CELLS: 0 /HPF
T PALLIDUM AB SER QL IA: NEGATIVE
UROBILINOGEN URINE: NORMAL
VIRAL LOAD INTERP: NORMAL
VIRAL LOAD LOG: ABNORMAL LG COP/ML
WHITE BLOOD CELLS URINE: 0 /HPF

## 2021-02-08 ENCOUNTER — NON-APPOINTMENT (OUTPATIENT)
Age: 76
End: 2021-02-08

## 2021-02-08 LAB
C TRACH RRNA SPEC QL NAA+PROBE: NOT DETECTED
N GONORRHOEA RRNA SPEC QL NAA+PROBE: NOT DETECTED
SOURCE ORAL: NORMAL

## 2021-04-01 ENCOUNTER — APPOINTMENT (OUTPATIENT)
Dept: INFECTIOUS DISEASE | Facility: CLINIC | Age: 76
End: 2021-04-01

## 2021-06-30 ENCOUNTER — NON-APPOINTMENT (OUTPATIENT)
Age: 76
End: 2021-06-30

## 2021-07-05 ENCOUNTER — FORM ENCOUNTER (OUTPATIENT)
Age: 76
End: 2021-07-05

## 2021-07-06 ENCOUNTER — APPOINTMENT (OUTPATIENT)
Dept: INFECTIOUS DISEASE | Facility: CLINIC | Age: 76
End: 2021-07-06
Payer: MEDICARE

## 2021-07-06 VITALS
HEIGHT: 65 IN | BODY MASS INDEX: 24.49 KG/M2 | SYSTOLIC BLOOD PRESSURE: 123 MMHG | TEMPERATURE: 97.9 F | WEIGHT: 147 LBS | DIASTOLIC BLOOD PRESSURE: 71 MMHG | OXYGEN SATURATION: 99 % | HEART RATE: 65 BPM

## 2021-07-06 DIAGNOSIS — Z01.00 ENCOUNTER FOR EXAMINATION OF EYES AND VISION W/OUT ABNORMAL FINDINGS: ICD-10-CM

## 2021-07-06 DIAGNOSIS — Z01.20 ENCOUNTER FOR DENTAL EXAMINATION AND CLEANING W/OUT ABNORMAL FINDINGS: ICD-10-CM

## 2021-07-06 DIAGNOSIS — B20 HUMAN IMMUNODEFICIENCY VIRUS [HIV] DISEASE: ICD-10-CM

## 2021-07-06 PROCEDURE — 99213 OFFICE O/P EST LOW 20 MIN: CPT

## 2021-07-06 NOTE — HISTORY OF PRESENT ILLNESS
[FreeTextEntry1] : 7/6/2021\par Rectal specialist Paolo Charbel fax [phone]\par 75 yoM with HIV/AIDS (dx 2016, CD4 leslie 130), taking Biktarvy daily HTN, HLA, COPD, GERD, DM, daily marijuana use, here for f/u. Virally suppressed on Biktarvy, no missed doses. No reported side effects. Pt Went Friday 11/8/2019 for bladder biopsy to rule out cancer and was negative.\par Just seen by otho to rule out CMV and all was negative as per pt. Recent chest X -Ray was normal \par PCP is in at [address], Dr. Dumont [phone]. FAX- [phone]\par Dr Max [phone] is GI and is going this week -[phone]\par Pulmonary Dr. Adrien Mack phone [phone] FAX- [phone]. \par In June 2020 pt was seen for rectal pain and bleeding in [place] and a sigmoidoscopy was completed on 6/4/20 at [facility] by surgeon Cabrera White and CMV was noted on the results of the rectal biopsy and started with valganciclovir 450mg tablets for 42 days, completed valcyclovir and states feeling really good. \par Just had flu vaccine in sept 2020 at outside pharmacy. \par Just had first Pfizer vaccine second due on Feb 12th 2021\par plan 7/6/2021: \par 1) labs today and all STD testing and continue Biktarvy \par 2) see in person in 4 months.\par 3) try to find ID specialist in [place].  \par \par \par Has colonoscopy scheduled for 6/18/19\par Dental appt pending \par Completed a second HepB series with PCP last year. \par Smokes marijuana daily. Former cigarette smoker quit 40 years ago. Drinks a few times per week\par Lives in [place] Nov-May. Wife stays in [place] most of the time.\par Pt sometimes has male partners while in [place], meets at club. Occasionally uses condoms. Oral/anal verse \par \par Sexual History: Not sexually active with wife. Casual male partners The patient is sexually active and is not monogamous. The patient is using condoms for some encounters. \par Occupation: retired. \par Lives with wife HIV neg aware of pt status. \par \par \par  \par Active Problems\par Abnormal chest CT (793.2) (R93.89)\par Adjustment disorder, unspecified type (309.9) (F43.20)\par AIDS (042) (B20)\par Anal fistula (565.1) (K60.3)\par Anal skin tag (455.9) (K64.4)\par BPH (benign prostatic hypertrophy) (600.00) (N40.0)\par Chronic obstructive pulmonary disease, unspecified COPD type (496) (J44.9)\par CMV (cytomegalovirus) infection (078.5) (B25.9)\par Diverticulitis of colon (562.11) (K57.32)\par Educated about COVID-19 virus infection (V65.49) (Z71.89)\par Erectile dysfunction (607.84) (N52.9)\par Exposure to communicable disease (V01.9) (Z20.9)\par Gastroesophageal reflux disease with esophagitis (530.11) (K21.00)\par Hemorrhoid (455.6) (K64.9)\par HIV disease (042) (B20)\par Hyperkalemia (276.7) (E87.5)\par Hyperlipidemia (272.4) (E78.5)\par IgA monoclonal gammopathy (273.1) (D47.2)\par Marijuana smoker (305.20) (F12.90)\par Muscle cramp, nocturnal (729.82) (R25.2)\par Need for hepatitis A immunization (V05.3) (Z23)\par Need for hepatitis B vaccination (V05.3) (Z23)\par Pre-procedural laboratory examination (V72.63) (Z01.812)\par Proctitis (569.49) (K62.89)\par Rectal bleeding (569.3) (K62.5)\par Serum potassium elevated (276.7) (E87.5)\par Snoring (786.09) (R06.83)\par SOB (shortness of breath) (786.05) (R06.02)\par Type 2 diabetes mellitus (250.00) (E11.9)\par \par Past Medical History\par History of Cough (786.2) (R05)\par History of CD4 count (V15.89) (Z92.89)\par History of esophageal reflux (V12.79) (Z87.19)\par History of herpes zoster (V12.09) (Z86.19)\par History of hypertension (V12.59) (Z86.79)\par History of shortness of breath (V13.89) (Z87.898)\par History of Internal hemorrhoids (455.0) (K64.8)\par History of Loss of appetite (783.0) (R63.0)\par History of Pneumonia of right lower lobe due to infectious organism (486) (J18.9)\par History of Thrombosed hemorrhoids (455.7) (K64.5)\par History of Visit for dental examination (V72.2) (Z01.20)\par History of Visit for eye and vision exam (V72.0) (Z01.00)\par History of Weight loss, unintentional (783.21) (R63.4)\par \par Current Meds\par Aspirin 81 MG Oral Tablet Chewable\par Atorvastatin Calcium 20 MG Oral Tablet; take 1 tablet by mouth at bedtime\par Azithromycin 500 MG Oral Tablet; take 2 tablets as a single dose now\par Biktarvy -25 MG Oral Tablet; TAKE ONE TABLET BY MOUTH DAILY\par Desloratadine 5 MG Oral Tablet; TAKE 1 TABLET DAILY AS NEEDED FOR ALLERGIES\par hydroCHLOROthiazide 12.5 MG Oral Capsule\par Montelukast Sodium 10 MG Oral Tablet; TAKE 1 TABLET AT BEDTIME\par MSM TABS\par Multivitamins TABS\par Olopatadine HCl - 0.6 % Nasal Solution; one sniff each nostril b.i.d\par Omnaris 50 MCG/ACT Nasal Suspension; INHALE 1 PUFF TWICE A DAY\par Sertraline HCl - 25 MG Oral Tablet\par Shingrix 50 MCG Intramuscular Suspension Reconstituted; INJECT 0.5ML\par INTRAMUSCULARLY X1 DOSE AT MONTH 0, THEN 1 DOSE IN\par 2-6 MONTHS (TOTAL = 2 DOSES)\par Sildenafil Citrate 100 MG Oral Tablet; TAKE 1 TABLET DAILY 1 HOUR BEFORE NEEDED\par Spirulina TABS\par Vitamin B12 1000 MCG Oral Tablet Extended Release\par Vitamin E TABS\par Zetia 10 MG Oral Tablet\par \par Allergies\par Penicillins\par abacavir\par

## 2021-07-07 LAB
25(OH)D3 SERPL-MCNC: 62.2 NG/ML
ALBUMIN SERPL ELPH-MCNC: 5 G/DL
ALP BLD-CCNC: 99 U/L
ALT SERPL-CCNC: 14 U/L
ANION GAP SERPL CALC-SCNC: 14 MMOL/L
APPEARANCE: CLEAR
AST SERPL-CCNC: 16 U/L
BACTERIA: NEGATIVE
BASOPHILS # BLD AUTO: 0.04 K/UL
BASOPHILS NFR BLD AUTO: 0.7 %
BILIRUB SERPL-MCNC: 0.8 MG/DL
BILIRUBIN URINE: NEGATIVE
BLOOD URINE: NEGATIVE
BUN SERPL-MCNC: 24 MG/DL
C TRACH RRNA SPEC QL NAA+PROBE: NOT DETECTED
CALCIUM SERPL-MCNC: 9.9 MG/DL
CD3 CELLS # BLD: 1230 /UL
CD3 CELLS NFR BLD: 74 %
CD3+CD4+ CELLS # BLD: 529 /UL
CD3+CD4+ CELLS NFR BLD: 32 %
CD3+CD4+ CELLS/CD3+CD8+ CLL SPEC: 0.82 RATIO
CD3+CD8+ CELLS # SPEC: 642 /UL
CD3+CD8+ CELLS NFR BLD: 39 %
CHLORIDE SERPL-SCNC: 98 MMOL/L
CHOLEST SERPL-MCNC: 142 MG/DL
CO2 SERPL-SCNC: 24 MMOL/L
COLOR: YELLOW
CREAT SERPL-MCNC: 1.19 MG/DL
EOSINOPHIL # BLD AUTO: 0.11 K/UL
EOSINOPHIL NFR BLD AUTO: 2 %
GLUCOSE QUALITATIVE U: NEGATIVE
GLUCOSE SERPL-MCNC: 128 MG/DL
HCT VFR BLD CALC: 46.7 %
HCV AB SER QL: NONREACTIVE
HCV S/CO RATIO: 0.37 S/CO
HDLC SERPL-MCNC: 40 MG/DL
HGB BLD-MCNC: 15 G/DL
HIV1 RNA # SERPL NAA+PROBE: NORMAL
HIV1 RNA # SERPL NAA+PROBE: NORMAL COPIES/ML
HYALINE CASTS: 0 /LPF
IMM GRANULOCYTES NFR BLD AUTO: 0.2 %
KETONES URINE: NEGATIVE
LDLC SERPL CALC-MCNC: 76 MG/DL
LEUKOCYTE ESTERASE URINE: NEGATIVE
LYMPHOCYTES # BLD AUTO: 1.9 K/UL
LYMPHOCYTES NFR BLD AUTO: 34.5 %
MAGNESIUM SERPL-MCNC: 2.3 MG/DL
MAN DIFF?: NORMAL
MCHC RBC-ENTMCNC: 29.7 PG
MCHC RBC-ENTMCNC: 32.1 GM/DL
MCV RBC AUTO: 92.5 FL
MICROSCOPIC-UA: NORMAL
MONOCYTES # BLD AUTO: 0.36 K/UL
MONOCYTES NFR BLD AUTO: 6.5 %
N GONORRHOEA RRNA SPEC QL NAA+PROBE: NOT DETECTED
NEUTROPHILS # BLD AUTO: 3.09 K/UL
NEUTROPHILS NFR BLD AUTO: 56.1 %
NITRITE URINE: NEGATIVE
NONHDLC SERPL-MCNC: 102 MG/DL
PH URINE: 6
PLATELET # BLD AUTO: 248 K/UL
POTASSIUM SERPL-SCNC: 5.1 MMOL/L
PROT SERPL-MCNC: 8.4 G/DL
PROTEIN URINE: NORMAL
PSA SERPL-MCNC: 2.5 NG/ML
RBC # BLD: 5.05 M/UL
RBC # FLD: 13.2 %
RED BLOOD CELLS URINE: 1 /HPF
SODIUM SERPL-SCNC: 136 MMOL/L
SOURCE AMPLIFICATION: NORMAL
SOURCE ANAL: NORMAL
SOURCE ORAL: NORMAL
SPECIFIC GRAVITY URINE: 1.03
SQUAMOUS EPITHELIAL CELLS: 0 /HPF
T PALLIDUM AB SER QL IA: NEGATIVE
TRIGL SERPL-MCNC: 126 MG/DL
UROBILINOGEN URINE: NORMAL
VIRAL LOAD INTERP: NORMAL
VIRAL LOAD LOG: NORMAL LG COP/ML
WBC # FLD AUTO: 5.51 K/UL
WHITE BLOOD CELLS URINE: 0 /HPF

## 2021-07-08 ENCOUNTER — APPOINTMENT (OUTPATIENT)
Dept: INFECTIOUS DISEASE | Facility: CLINIC | Age: 76
End: 2021-07-08
Payer: MEDICARE

## 2021-07-08 PROCEDURE — 96132 NRPSYC TST EVAL PHYS/QHP 1ST: CPT

## 2021-07-08 PROCEDURE — 96136 PSYCL/NRPSYC TST PHY/QHP 1ST: CPT

## 2021-07-08 PROCEDURE — 96133 NRPSYC TST EVAL PHYS/QHP EA: CPT

## 2021-07-08 PROCEDURE — 90791 PSYCH DIAGNOSTIC EVALUATION: CPT

## 2021-07-08 PROCEDURE — 96137 PSYCL/NRPSYC TST PHY/QHP EA: CPT

## 2021-07-09 ENCOUNTER — NON-APPOINTMENT (OUTPATIENT)
Age: 76
End: 2021-07-09

## 2021-07-09 ENCOUNTER — APPOINTMENT (OUTPATIENT)
Dept: PULMONOLOGY | Facility: CLINIC | Age: 76
End: 2021-07-09
Payer: MEDICARE

## 2021-07-09 VITALS
WEIGHT: 145 LBS | RESPIRATION RATE: 16 BRPM | TEMPERATURE: 97.1 F | SYSTOLIC BLOOD PRESSURE: 120 MMHG | HEART RATE: 84 BPM | BODY MASS INDEX: 24.16 KG/M2 | DIASTOLIC BLOOD PRESSURE: 66 MMHG | HEIGHT: 65 IN | OXYGEN SATURATION: 95 %

## 2021-07-09 DIAGNOSIS — R06.02 SHORTNESS OF BREATH: ICD-10-CM

## 2021-07-09 DIAGNOSIS — R93.89 ABNORMAL FINDINGS ON DIAGNOSTIC IMAGING OF OTHER SPECIFIED BODY STRUCTURES: ICD-10-CM

## 2021-07-09 DIAGNOSIS — B20 HUMAN IMMUNODEFICIENCY VIRUS [HIV] DISEASE: ICD-10-CM

## 2021-07-09 DIAGNOSIS — R25.2 CRAMP AND SPASM: ICD-10-CM

## 2021-07-09 DIAGNOSIS — Z71.89 OTHER SPECIFIED COUNSELING: ICD-10-CM

## 2021-07-09 DIAGNOSIS — K21.00 GASTRO-ESOPHAGEAL REFLUX DISEASE WITH ESOPHAGITIS, WITHOUT BLEEDING: ICD-10-CM

## 2021-07-09 DIAGNOSIS — J44.9 CHRONIC OBSTRUCTIVE PULMONARY DISEASE, UNSPECIFIED: ICD-10-CM

## 2021-07-09 PROCEDURE — 94010 BREATHING CAPACITY TEST: CPT

## 2021-07-09 PROCEDURE — 99214 OFFICE O/P EST MOD 30 MIN: CPT | Mod: 25

## 2021-07-09 PROCEDURE — 94618 PULMONARY STRESS TESTING: CPT

## 2021-07-09 PROCEDURE — 95012 NITRIC OXIDE EXP GAS DETER: CPT

## 2021-07-09 RX ORDER — OLOPATADINE HYDROCHLORIDE 665 UG/1
0.6 SPRAY, METERED NASAL
Qty: 1 | Refills: 3 | Status: ACTIVE | COMMUNITY
Start: 2021-07-09 | End: 1900-01-01

## 2021-07-09 NOTE — ADDENDUM
[FreeTextEntry1] : Documented by Roseanna Reddy acting as a scribe for Dr. Adrien Mack on 07/09/2021 \par \par All medical record entries made by the Scribe were at my, Dr. Adrien Mack's, direction and personally dictated by me on 07/09/2021 . I have reviewed the chart and agree that the record accurately reflects my personal performance of the history, physical exam, assessment and plan. I have also personally directed, reviewed, and agree with the discharge instructions.

## 2021-07-09 NOTE — HISTORY OF PRESENT ILLNESS
[FreeTextEntry1] : Mr. Pace is a 76 year old male with a history of abnormal chest CT, AIDS, BPH, COPD, diverticulitis, GERD, hyperlipidemia, snoring, SOB, DM, who presents into the office for a follow up visit. His chief complaint is \par - he has been feeling a little anxious since he has been back in [place]. \par - no fevers / chills / sweats \par - no chest pain / pressure\par - bowel movements are regular \par - s/p COVID vaccine ; pfizer x2 \par - lost 4 lbs since he was last seen \par - his vision is okay \par - his CMV is gone \par - he notes his nose still leaks \par - he notes he smokes marijuana \par - He  denies any visual issues, headaches, nausea, vomiting, fever, chills, sweats, chest pains, chest pressure, diarrhea, constipation, dysphagia, myalgia, dizziness, leg swelling, leg pain, itchy eyes, itchy ears, heartburn, reflux, or sour taste in the mouth.

## 2021-07-09 NOTE — ASSESSMENT
[FreeTextEntry1] : Mr. Pace is 76 y.o M who is doing well form a pulmonary perspective. He has a history of COPD, AIDS, allergy, CMV  past infection,  marijuana use, abnormal CT. He is stable from a pulmonary perspective. He presents into the office today for pulmonary follow up.  His number one issue is anxiety \par problem 1: lung cancer screening (new nodule 7 mm 11/16/18)\par -residual abnormality on CT c/w inflammatory disease\par -follow up chest CT in 8/2021 \par \par problem 2: COPD - stable \par -prior bronchitis resolved\par -on the shelf is Stiolto and QVar\par \par -Inhaler technique reviewed as well as oral hygiene techniques reviewed with patient. Avoidance of cold air, extremes of temperature, rescue inhaler should be used before exercise. Order of medication reviewed with patient. Recommended use of a cool mist humidifier in the bedroom. \par \par problem 3: allergy sinus \par -continue Clarinex 5 mg before bed\par -continue Singulair 10 mg before bed \par -continue Omnaris 1 sniff BID\par -add Olopatadine 0.6% 1 sniff BID \par -recommended to use OTC eye drops and nasal saline\par -Environmental measures for allergies were encouraged including mattress and pillow cover, air purifier, and environmental controls.\par \par \par problem 4: marijuana use\par -marijuana cessation encouraged (approximately 4 minutes) handout provided\par -recommended to get evaluated by Dr. Brenda Vera\par \par problem 5: snoring\par -recommended to use nasal saline\par -recommended positional sleep \par \par problem 6: GERD\par -diet controlled \par -Rule of 2s: avoid eating too much, eating too late, eating too spicy, eating two hours before bed\par -Things to avoid including overeating, spicy foods, tight clothing, eating within three hours of bed, this list is not all inclusive. \par -For treatment of reflux, possible options discussed including diet control, H2 blockers, PPIs, as well as coating motility agents discussed as treatment options. Timing of meals and proximity of last meal to sleep were discussed. If symptoms persist, a formal gastrointestinal evaluation is needed.\par \par problem 7: overweight (resolved)\par -Weight loss, exercise, and diet control were discussed and are highly encouraged. Treatment options were given such as, aqua therapy, and contacting a nutritionist. Recommended to use the elliptical, stationary bike, less use of treadmill.  Obesity is associated with worsening asthma, shortness of breath, and potential for cardiac disease, diabetes, and other underlying medical conditions. \par \par Problem 8: Health Maintenance/COVID19 Precautions: s/p pfizer x2 \par - Clean your hands often. Wash your hands often with soap and water for at least 20 seconds, especially after blowing your nose, coughing, or sneezing, or having been in a public place.\par - If soap and water are not available, use a hand  that contains at least 60% alcohol.\par - To the extent possible, avoid touching high-touch surfaces in public places - elevator buttons, door handles, handrails, handshaking with people, etc. Use a tissue or your sleeve to cover your hand or finger if you must touch something.\par - Wash your hands after touching surfaces in public places.\par - Avoid touching your face, nose, eyes, etc.\par - Clean and disinfect your home to remove germs: practice routine cleaning of frequently touched surfaces (for example: tables, doorknobs, light switches, handles, desks, toilets, faucets, sinks & cell phones)\par - Avoid crowds, especially in poorly ventilated spaces. Your risk of exposure to respiratory viruses like COVID-19 may increase in crowded, closed-in settings with little air circulation if there are people in the crowd who are sick. All patients are recommended to practice social distancing and stay at least 6 feet away from others.\par - Avoid all non-essential travel including plane trips, and especially avoid embarking on cruise ships.\par -If COVID-19 is spreading in your community, take extra measures to put distance between yourself and other people to further reduce your risk of being exposed to this new virus.\par -Stay home as much as possible.\par - Consider ways of getting food brought to your house through family, social, or commercial networks\par -Be aware that the virus has been known to live in the air up to 3 hours post exposure, cardboard up to 24 hours post exposure, copper up to 4 hours post exposure, steel and plastic up to 2-3 days post exposure. Risk of transmission from these surfaces are affected by many variables.\par Immune Support Recommendations:\par -OTC Vitamin C 500mg BID \par -OTC Quercetin 250-500mg BID \par -OTC Zinc 75-100mg per day \par -OTC Melatonin 1 or 2 mg a night \par -OTC Vitamin D 1-4000mg per day \par -OTC Tonic Water 8oz per day\par Asthma and COVID19:\par You need to make sure your asthma is under control. This often requires the use of inhaled corticosteroids (and sometimes oral corticosteroids). Inhaled corticosteroids do not likely reduce your immune system’s ability to fight infections, but oral corticosteroids may. It is important to use the steps above to protect yourself to limit your exposure to any respiratory virus.\par \par problem 9: health maintenance \par -s/p influenza vaccine - 2019\par -recommended strep pneumonia vaccines: Prevnar-13 vaccine, followed by Pneumo vaccine 23 one year following\par -recommended early intervention for URIs\par -recommended regular osteoporosis evaluations\par -recommended early dermatological evaluations\par -recommended after the age of 50 to the age of 70, colonoscopy every 5 years \par \par F/U in 6 months with SPI and DLCO\par He is encouraged to call with any changes, concerns, or questions.

## 2021-07-09 NOTE — PROCEDURE
[FreeTextEntry1] : PFT reveals normal flows, with an FEV1 of  3.17 L, which is  131% of predicted, with normal flow volume loop \par \par 6 minute walk test reveals a low saturation of  98% with no evidence of dyspnea or fatigue; walked  489 meters \par \par  FENO was15 ; normal value being less than 25\par Fractional exhaled nitric oxide (FENO) is regarded as a simple, noninvasive method for assessing eosinophilic airway inflammation. Produced by a variety of cells within the lung, nitric oxide (NO) concentrations are generally low in healthy individuals. However, high concentrations of NO appear to be involved in nonspecific host defense mechanisms and chronic inflammatory diseases such as asthma. The American Thoracic Society (ATS) therefore has recommended using FENO to aid in the diagnosis and monitoring of eosinophilic airway inflammation and asthma, and for identifying steroid responsive individuals whose chronic respiratory symptoms may be airway inflammation.

## 2021-07-22 ENCOUNTER — APPOINTMENT (OUTPATIENT)
Dept: INFECTIOUS DISEASE | Facility: CLINIC | Age: 76
End: 2021-07-22
Payer: MEDICARE

## 2021-07-22 PROCEDURE — 99214 OFFICE O/P EST MOD 30 MIN: CPT | Mod: 95

## 2021-08-11 ENCOUNTER — NON-APPOINTMENT (OUTPATIENT)
Age: 76
End: 2021-08-11

## 2021-08-25 NOTE — ASU PATIENT PROFILE, ADULT - NSCAFFEINETYPE_GEN_ALL_CORE_SD
Problem: Discharge Planning:  Goal: Discharged to appropriate level of care  Description: Discharged to appropriate level of care  8/24/2021 2203 by Denys Wise RN  Outcome: Ongoing  8/24/2021 1849 by Denys Wise RN  Outcome: Ongoing     Problem: Fluid Volume - Deficit:  Goal: Absence of fluid volume deficit signs and symptoms  Description: Absence of fluid volume deficit signs and symptoms  8/24/2021 2203 by Denys Wise RN  Outcome: Ongoing  8/24/2021 1849 by Denys Wise RN  Outcome: Ongoing     Problem: Nutrition Deficit:  Goal: Ability to achieve adequate nutritional intake will improve  Description: Ability to achieve adequate nutritional intake will improve  8/24/2021 2203 by Denys Wise RN  Outcome: Ongoing  8/24/2021 1849 by Denys Wise RN  Outcome: Ongoing     Problem: Sleep Pattern Disturbance:  Goal: Appears well-rested  Description: Appears well-rested  8/24/2021 2203 by Denys Wise RN  Outcome: Ongoing  8/24/2021 1849 by Denys Wise RN  Outcome: Ongoing     Problem: Violence - Risk of, Self/Other-Directed:  Goal: Knowledge of developmental care interventions  Description: Absence of violence  8/24/2021 2203 by Denys Wise RN  Outcome: Ongoing  8/24/2021 1849 by Denys Wise RN  Outcome: Ongoing     Problem: Pain:  Description: Pain management should include both nonpharmacologic and pharmacologic interventions.   Goal: Pain level will decrease  Description: Pain level will decrease  8/24/2021 2203 by Denys Wise RN  Outcome: Ongoing  8/24/2021 1849 by Denys Wise RN  Outcome: Ongoing  Goal: Control of acute pain  Description: Control of acute pain  8/24/2021 2203 by Denys Wise RN  Outcome: Ongoing  8/24/2021 1849 by Denys Wise RN  Outcome: Ongoing  Goal: Control of chronic pain  Description: Control of chronic pain  8/24/2021 2203 by Denys Wise RN  Outcome: Ongoing  8/24/2021 1849 by Amaryllis Wise, RN  Outcome: Ongoing     Problem: Falls - Risk of:  Goal: Will remain free from falls  Description: Will remain free from falls  8/24/2021 2203 by Laverna Meckel, RN  Outcome: Ongoing  8/24/2021 1849 by Laverna Meckel, RN  Outcome: Ongoing  Goal: Absence of physical injury  Description: Absence of physical injury  8/24/2021 2203 by Laverna Meckel, RN  Outcome: Ongoing  8/24/2021 1849 by Laverna Meckel, RN  Outcome: Ongoing     Problem: Skin Integrity:  Goal: Will show no infection signs and symptoms  Description: Will show no infection signs and symptoms  8/24/2021 2203 by Laverna Meckel, RN  Outcome: Ongoing  8/24/2021 1849 by Laverna Meckel, RN  Outcome: Ongoing  Goal: Absence of new skin breakdown  Description: Absence of new skin breakdown  8/24/2021 2203 by Laverna Meckel, RN  Outcome: Ongoing  8/24/2021 1849 by Laverna Meckel, RN  Outcome: Ongoing coffee

## 2021-10-18 ENCOUNTER — RX RENEWAL (OUTPATIENT)
Age: 76
End: 2021-10-18

## 2021-10-18 RX ORDER — BICTEGRAVIR SODIUM, EMTRICITABINE, AND TENOFOVIR ALAFENAMIDE FUMARATE 50; 200; 25 MG/1; MG/1; MG/1
50-200-25 TABLET ORAL
Qty: 90 | Refills: 3 | Status: ACTIVE | COMMUNITY
Start: 2018-07-19 | End: 1900-01-01

## 2022-01-12 ENCOUNTER — NON-APPOINTMENT (OUTPATIENT)
Age: 77
End: 2022-01-12

## 2022-11-22 NOTE — ED ADULT NURSE NOTE - CHIEF COMPLAINT
The patient is a 75y Male complaining of Spironolactone Pregnancy And Lactation Text: This medication can cause feminization of the male fetus and should be avoided during pregnancy. The active metabolite is also found in breast milk.
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
## 2023-08-16 NOTE — ADDENDUM
[FreeTextEntry1] : Shortage of HepB vaccine, defer start of repeat series until next month when pt returns for labwork and nursing visit. 16-Aug-2023 16:51

## 2023-11-07 NOTE — ED ADULT NURSE NOTE - COVID-19 RESULT
LOW CARBOHYDRATE FOR  WEIGHT CONTROL. LOW FAT DIET FOR CHOLESTEROL CONTROL. TAKE CRESTOR 10 MG. NIGHTLY FOR CHOLESTEROL CONTROL. TAKE ELIQUIS 5 MG. 2 TIMES A DAY FOR RECURRENT PULMONARY EMBOLISM. TAKE  VITAMIN D-3 2000 UNITS DAILY  FOR MAINTENANCES. REGULAR WALKING ADVISED. ADVISED WEIGHT REDUCTION. FASTING FOR LAB WORK PRIOR TO NEXT VISIT. KEEP NEXT APPOINTMENT IN 3 MONTHS. NEGATIVE
